# Patient Record
Sex: FEMALE | Race: WHITE | NOT HISPANIC OR LATINO | Employment: FULL TIME | URBAN - METROPOLITAN AREA
[De-identification: names, ages, dates, MRNs, and addresses within clinical notes are randomized per-mention and may not be internally consistent; named-entity substitution may affect disease eponyms.]

---

## 2019-07-21 ENCOUNTER — APPOINTMENT (OUTPATIENT)
Dept: RADIOLOGY | Facility: CLINIC | Age: 24
End: 2019-07-21
Payer: COMMERCIAL

## 2019-07-21 ENCOUNTER — OFFICE VISIT (OUTPATIENT)
Dept: URGENT CARE | Facility: CLINIC | Age: 24
End: 2019-07-21
Payer: COMMERCIAL

## 2019-07-21 VITALS
HEIGHT: 66 IN | RESPIRATION RATE: 17 BRPM | BODY MASS INDEX: 44.2 KG/M2 | TEMPERATURE: 98 F | DIASTOLIC BLOOD PRESSURE: 56 MMHG | SYSTOLIC BLOOD PRESSURE: 116 MMHG | HEART RATE: 95 BPM | WEIGHT: 275 LBS | OXYGEN SATURATION: 97 %

## 2019-07-21 DIAGNOSIS — R06.2 WHEEZING: ICD-10-CM

## 2019-07-21 DIAGNOSIS — J02.9 SORE THROAT: ICD-10-CM

## 2019-07-21 DIAGNOSIS — R05.9 COUGH: ICD-10-CM

## 2019-07-21 DIAGNOSIS — J40 BRONCHITIS: Primary | ICD-10-CM

## 2019-07-21 LAB — S PYO AG THROAT QL: NEGATIVE

## 2019-07-21 PROCEDURE — 87880 STREP A ASSAY W/OPTIC: CPT | Performed by: PHYSICIAN ASSISTANT

## 2019-07-21 PROCEDURE — 87147 CULTURE TYPE IMMUNOLOGIC: CPT | Performed by: PHYSICIAN ASSISTANT

## 2019-07-21 PROCEDURE — 71046 X-RAY EXAM CHEST 2 VIEWS: CPT

## 2019-07-21 PROCEDURE — 99203 OFFICE O/P NEW LOW 30 MIN: CPT | Performed by: PHYSICIAN ASSISTANT

## 2019-07-21 PROCEDURE — 87070 CULTURE OTHR SPECIMN AEROBIC: CPT | Performed by: PHYSICIAN ASSISTANT

## 2019-07-21 RX ORDER — BROMPHENIRAMINE MALEATE, PSEUDOEPHEDRINE HYDROCHLORIDE, AND DEXTROMETHORPHAN HYDROBROMIDE 2; 30; 10 MG/5ML; MG/5ML; MG/5ML
2.5 SYRUP ORAL 4 TIMES DAILY PRN
Qty: 120 ML | Refills: 0 | Status: SHIPPED | OUTPATIENT
Start: 2019-07-21 | End: 2019-12-12

## 2019-07-21 RX ORDER — ALBUTEROL SULFATE 90 UG/1
2 AEROSOL, METERED RESPIRATORY (INHALATION) EVERY 4 HOURS PRN
Status: DISCONTINUED | OUTPATIENT
Start: 2019-07-21 | End: 2019-07-21

## 2019-07-21 RX ORDER — SERTRALINE HYDROCHLORIDE 100 MG/1
TABLET, FILM COATED ORAL
Refills: 0 | COMMUNITY
Start: 2019-07-16 | End: 2020-02-25

## 2019-07-21 RX ORDER — LISDEXAMFETAMINE DIMESYLATE 50 MG
40 CAPSULE ORAL EVERY MORNING
Refills: 0 | COMMUNITY
Start: 2019-07-16 | End: 2020-02-25 | Stop reason: DRUGHIGH

## 2019-07-21 RX ORDER — ALBUTEROL SULFATE 90 UG/1
2 AEROSOL, METERED RESPIRATORY (INHALATION) EVERY 6 HOURS PRN
Qty: 6.7 G | Refills: 0 | Status: SHIPPED | OUTPATIENT
Start: 2019-07-21 | End: 2020-02-25

## 2019-07-21 RX ORDER — PREDNISONE 50 MG/1
50 TABLET ORAL DAILY
Qty: 5 TABLET | Refills: 0 | Status: SHIPPED | OUTPATIENT
Start: 2019-07-21 | End: 2019-07-26 | Stop reason: ALTCHOICE

## 2019-07-21 RX ORDER — TRAZODONE HYDROCHLORIDE 50 MG/1
TABLET ORAL
Refills: 0 | COMMUNITY
Start: 2019-07-16 | End: 2019-12-12

## 2019-07-21 RX ORDER — PREDNISONE 10 MG/1
50 TABLET ORAL DAILY
Status: DISCONTINUED | OUTPATIENT
Start: 2019-07-21 | End: 2019-07-21

## 2019-07-21 NOTE — PATIENT INSTRUCTIONS
Salt water gargles for sore throat  Inhaler to be used PRN for wheezing  Bromfed called in to help with cough  Will follow up if official results of the Xray  Can use Flonase and saline wash for nasal congestion  Prednisone for wheezing 50 mg once a day for 5 days  Can use humidifier  Report to ER with shortness of breath, difficulty swallowing or chest pain  Follow up with PCP in 3-5 days  Proceed to  ER if symptoms worsen  Acute Bronchitis   WHAT YOU NEED TO KNOW:   Acute bronchitis is swelling and irritation in the air passages of your lungs  This irritation may cause you to cough or have other breathing problems  Acute bronchitis often starts because of another illness, such as a cold or the flu  The illness spreads from your nose and throat to your windpipe and airways  Bronchitis is often called a chest cold  Acute bronchitis lasts about 3 to 6 weeks and is usually not a serious illness  Your cough can last for several weeks  DISCHARGE INSTRUCTIONS:   Return to the emergency department if:   · You cough up blood  · Your lips or fingernails turn blue  · You feel like you are not getting enough air when you breathe  Contact your healthcare provider if:   · You have a fever  · Your breathing problems do not go away or get worse  · Your cough does not get better within 4 weeks  · You have questions or concerns about your condition or care  Self-care:   · Get more rest   Rest helps your body to heal  Slowly start to do more each day  Rest when you feel it is needed  · Avoid irritants in the air  Avoid chemicals, fumes, and dust  Wear a face mask if you must work around dust or fumes  Stay inside on days when air pollution levels are high  If you have allergies, stay inside when pollen counts are high  Do not use aerosol products, such as spray-on deodorant, bug spray, and hair spray  · Do not smoke or be around others who smoke    Nicotine and other chemicals in cigarettes and cigars damages the cilia that move mucus out of your lungs  Ask your healthcare provider for information if you currently smoke and need help to quit  E-cigarettes or smokeless tobacco still contain nicotine  Talk to your healthcare provider before you use these products  · Drink liquids as directed  Liquids help keep your air passages moist and help you cough up mucus  You may need to drink more liquids when you have acute bronchitis  Ask how much liquid to drink each day and which liquids are best for you  · Use a humidifier or vaporizer  Use a cool mist humidifier or a vaporizer to increase air moisture in your home  This may make it easier for you to breathe and help decrease your cough  Decrease risk for acute bronchitis:   · Get the vaccinations you need  Ask your healthcare provider if you should get vaccinated against the flu or pneumonia  · Prevent the spread of germs  You can decrease your risk of acute bronchitis and other illnesses by doing the following:     [facility] AUTHOR[facility] your hands often with soap and water  Carry germ-killing hand lotion or gel with you  You can use the lotion or gel to clean your hands when soap and water are not available  ¨ Do not touch your eyes, nose, or mouth unless you have washed your hands first     ¨ Always cover your mouth when you cough to prevent the spread of germs  It is best to cough into a tissue or your shirt sleeve instead of into your hand  Ask those around you cover their mouths when they cough  ¨ Try to avoid people who have a cold or the flu  If you are sick, stay away from others as much as possible  Medicines: Your healthcare provider may  give you any of the following:  · Ibuprofen or acetaminophen  are medicines that help lower your fever  They are available without a doctor's order  Ask your healthcare provider which medicine is right for you  Ask how much to take and how often to take it  Follow directions   These medicines can cause stomach bleeding if not taken correctly  Ibuprofen can cause kidney damage  Do not take ibuprofen if you have kidney disease, an ulcer, or allergies to aspirin  Acetaminophen can cause liver damage  Do not take more than 4,000 milligrams in 24 hours  · Decongestants  help loosen mucus in your lungs and make it easier to cough up  This can help you breathe easier  · Cough suppressants  decrease your urge to cough  If your cough produces mucus, do not take a cough suppressant unless your healthcare provider tells you to  Your healthcare provider may suggest that you take a cough suppressant at night so you can rest     · Inhalers  may be given  Your healthcare provider may give you one or more inhalers to help you breathe easier and cough less  An inhaler gives your medicine to open your airways  Ask your healthcare provider to show you how to use your inhaler correctly  · Take your medicine as directed  Contact your healthcare provider if you think your medicine is not helping or if you have side effects  Tell him of her if you are allergic to any medicine  Keep a list of the medicines, vitamins, and herbs you take  Include the amounts, and when and why you take them  Bring the list or the pill bottles to follow-up visits  Carry your medicine list with you in case of an emergency  Follow up with your healthcare provider as directed:  Write down questions you have so you will remember to ask them during your follow-up visits  © 2017 [facility] Information is for End User's use only and may not be sold, redistributed or otherwise used for commercial purposes  All illustrations and images included in CareNotes® are the copyrighted property of A D A M [employer] Inc  or [employer] Pr[employer]  The above information is an  only  It is not intended as medical advice for individual conditions or treatments   Talk to your doctor, nurse or pharmacist before following any medical regimen to see if it is safe and effective for you  Sore Throat, Ambulatory Care   GENERAL INFORMATION:   A sore throat  is often caused by a cold or flu virus  A sore throat may also be caused by bacteria such as strep  Other causes include smoking, a runny nose, allergies, or acid reflux  Seek immediate care for the following symptoms:   · Trouble breathing or swallowing because your throat is swollen or sore    · Drooling because it hurts too much to swallow    · A painful lump in your throat that does not go away after 5 days    · A fever higher than 102? F (39?C) or lasts longer than 3 days    · Confusion    · Blood in your throat or ear  Treatment for a sore throat  will depend on the cause how severe it is  A sore throat cause by a virus will go away on its own without treatment  You will need antibiotics if your sore throat is caused by bacteria  Your sore throat should start to feel better within 3 to 5 days for both viral and bacterial infections  Care for your sore throat:   · Gargle with salt water  Mix ¼ teaspoon salt in a glass of warm water and gargle  This may help reduce swelling in your throat  · Take ibuprofen or acetaminophen:  These medicines decrease pain and fever  They are available without a doctor's order  Ask your healthcare provider which medicine is best for you  Ask how much to take and how often to take it  · Drink more liquids  Cold or warm drinks may help soothe your sore throat  Drinking liquids can also help prevent dehydration  · Use a cool-steam humidifier  to help moisten the air in your room and reduce your throat pain  · Use lozenges, ice, soft foods, or popsicles  to soothe your throat  · Rest your throat as much as possible  Try not to use your voice  This may irritate your throat and worsen your symptoms  Follow up with your healthcare provider as directed:  Write down your questions so you remember to ask them during your visits    CARE AGREEMENT:   You have the right to help plan your care  Learn about your health condition and how it may be treated  Discuss treatment options with your caregivers to decide what care you want to receive  You always have the right to refuse treatment  The above information is an  only  It is not intended as medical advice for individual conditions or treatments  Talk to your doctor, nurse or pharmacist before following any medical regimen to see if it is safe and effective for you  © 2014 [address] is for End User's use only and may not be sold, redistributed or otherwise used for commercial purposes  All illustrations and images included in CareNotes® are the copyrighted property of [employer]  or [employer] Pratt

## 2019-07-21 NOTE — PROGRESS NOTES
[facility]        NAME: Demario Frank is a 21 y o  female  : 1995    MRN: [id number]  DATE: 2019  TIME: 9:24 AM    Assessment and Plan   Bronchitis [J40]  1  Bronchitis  brompheniramine-pseudoephedrine-DM 30-2-10 MG/5ML syrup    predniSONE 50 mg tablet    albuterol (PROVENTIL HFA) 90 mcg/act inhaler    DISCONTINUED: albuterol (PROVENTIL HFA,VENTOLIN HFA) inhaler 2 puff    DISCONTINUED: predniSONE tablet 50 mg   2  Cough  XR chest pa & lateral    POCT rapid strepA    brompheniramine-pseudoephedrine-DM 30-2-10 MG/5ML syrup    predniSONE 50 mg tablet    albuterol (PROVENTIL HFA) 90 mcg/act inhaler    DISCONTINUED: albuterol (PROVENTIL HFA,VENTOLIN HFA) inhaler 2 puff    DISCONTINUED: predniSONE tablet 50 mg    CANCELED: XR ribs bilateral 4+ vw w pa chest   3  Wheezing  XR chest pa & lateral    predniSONE 50 mg tablet    albuterol (PROVENTIL HFA) 90 mcg/act inhaler    DISCONTINUED: albuterol (PROVENTIL HFA,VENTOLIN HFA) inhaler 2 puff    DISCONTINUED: predniSONE tablet 50 mg    CANCELED: XR ribs bilateral 4+ vw w pa chest   4  Sore throat  Throat culture         Patient Instructions   Salt water gargles for sore throat  Inhaler to be used PRN for wheezing  Bromfed called in to help with cough  Will follow up if official results of the Xray  results reviewed in office  Official report also reviewed after patient left office which showed no disease  Rapid strep negative in office  Will send out for culture  Can use Flonase and saline wash for nasal congestion  Prednisone for wheezing 50 mg once a day for 5 days  Can use humidifier  Report to ER with shortness of breath, difficulty swallowing or chest pain  Follow up with PCP in 3-5 days  Proceed to  ER if symptoms worsen  Chief Complaint     Chief Complaint   Patient presents with    Cough     Pt reports of worsening cough and congestion started approx 1 week ago            History of Present Illness       HPI  This is a 21year old female here c/o of sore throat and productive cough for one week  Patient notes she is visiting from [facility] where she lives and landed last saturday  She notes some nasal congestion  Denies ear pain, fever, chills, nausea, vomiting, shortness of breathe or chest pain  Has had pneumonia twice in the past  Has been taking dayquil and nyquil for her symptoms  Review of Systems   Review of Systems   Constitutional: Negative for chills and fever  HENT: Positive for congestion and sore throat  Negative for ear pain, mouth sores, sinus pressure and sinus pain  Eyes: Negative for pain and discharge  Respiratory: Positive for cough  Negative for shortness of breath  Cardiovascular: Negative for chest pain and palpitations  Gastrointestinal: Negative for abdominal pain, diarrhea, nausea and vomiting  Skin: Negative for rash  Current Medications       Current Outpatient Medications:     albuterol (PROVENTIL HFA) 90 mcg/act inhaler, Inhale 2 puffs every 6 (six) hours as needed for wheezing, Disp: 6 7 g, Rfl: 0    brompheniramine-pseudoephedrine-DM 30-2-10 MG/5ML syrup, Take 2 5 mL by mouth 4 (four) times a day as needed for allergies, Disp: 120 mL, Rfl: 0    predniSONE 50 mg tablet, Take 1 tablet (50 mg total) by mouth daily, Disp: 5 tablet, Rfl: 0    sertraline (ZOLOFT) 100 mg tablet, , Disp: , Rfl: 0    traZODone (DESYREL) 50 mg tablet, , Disp: , Rfl: 0    VYVANSE 50 MG capsule, , Disp: , Rfl: 0  No current facility-administered medications for this visit       Current Allergies     Allergies as of 07/21/2019 - Reviewed 07/21/2019   Allergen Reaction Noted    Lorabid [loracarbef]  07/21/2019            The following portions of the patient's history were reviewed and updated as appropriate: allergies, current medications, past family history, past medical history, past social history, past surgical history and problem list      Past Medical History:   Diagnosis Date    Anxiety     COPD (chronic obstructive pulmonary disease) ([facility] [place] [address] )        History reviewed  No pertinent surgical history  History reviewed  No pertinent family history  Medications have been verified  Objective   /56   Pulse 95   Temp 98 °F (36 7 °C)   Resp 17   Ht 5' 6" (1 676 m)   Wt 125 kg (275 lb)   LMP 06/22/2019   SpO2 97%   BMI 44 39 kg/m²        Physical Exam     Physical Exam   Constitutional: She is oriented to person, place, and time  She appears well-developed and well-nourished  No distress  HENT:   Right Ear: External ear normal    Left Ear: External ear normal    Mouth/Throat: Posterior oropharyngeal erythema present  No oropharyngeal exudate, posterior oropharyngeal edema or tonsillar abscesses  Tonsils are 3+ on the right  Tonsils are 3+ on the left  No tonsillar exudate  Eyes: Conjunctivae are normal    Neck: Normal range of motion  Neck supple  Cardiovascular: Normal rate, regular rhythm and normal heart sounds  Pulmonary/Chest: Effort normal  No stridor  She has no decreased breath sounds  She has wheezes in the right upper field, the right middle field, the right lower field, the left upper field, the left middle field and the left lower field  She has no rhonchi  She has no rales  Abdominal: Soft  Bowel sounds are normal    Lymphadenopathy:     She has no cervical adenopathy  Neurological: She is alert and oriented to person, place, and time  Psychiatric: She has a normal mood and affect   Her behavior is normal

## 2019-07-22 ENCOUNTER — TELEPHONE (OUTPATIENT)
Dept: BARIATRICS | Facility: CLINIC | Age: 24
End: 2019-07-22

## 2019-07-22 DIAGNOSIS — J02.0 STREP THROAT: Primary | ICD-10-CM

## 2019-07-22 LAB — BACTERIA THROAT CULT: ABNORMAL

## 2019-07-22 RX ORDER — PENICILLIN V POTASSIUM 250 MG/1
250 TABLET ORAL 4 TIMES DAILY
Qty: 40 TABLET | Refills: 0 | Status: SHIPPED | OUTPATIENT
Start: 2019-07-22 | End: 2019-08-01

## 2019-07-22 NOTE — TELEPHONE ENCOUNTER
Milo calls from [facility] for clarification of this prescription  Their records indicate that this patient is allergic to penicillin  When she questions patient, patient indicates she is actually allergic to [address], but pharmacist feels this is very closely related to penicillin  Please advise

## 2019-07-26 ENCOUNTER — OFFICE VISIT (OUTPATIENT)
Dept: FAMILY MEDICINE CLINIC | Facility: CLINIC | Age: 24
End: 2019-07-26
Payer: COMMERCIAL

## 2019-07-26 VITALS
SYSTOLIC BLOOD PRESSURE: 122 MMHG | BODY MASS INDEX: 43.23 KG/M2 | TEMPERATURE: 99.4 F | HEIGHT: 66 IN | DIASTOLIC BLOOD PRESSURE: 80 MMHG | RESPIRATION RATE: 16 BRPM | HEART RATE: 84 BPM | WEIGHT: 269 LBS

## 2019-07-26 DIAGNOSIS — A60.04 HERPES SIMPLEX VULVOVAGINITIS: ICD-10-CM

## 2019-07-26 DIAGNOSIS — N89.8 VAGINAL DISCHARGE: ICD-10-CM

## 2019-07-26 DIAGNOSIS — N76.0 ACUTE VAGINITIS: Primary | ICD-10-CM

## 2019-07-26 PROBLEM — J02.9 SORE THROAT: Status: RESOLVED | Noted: 2019-07-21 | Resolved: 2019-07-26

## 2019-07-26 PROBLEM — A60.00 GENITAL HERPES: Status: ACTIVE | Noted: 2019-07-26

## 2019-07-26 PROBLEM — J40 BRONCHITIS: Status: RESOLVED | Noted: 2019-07-21 | Resolved: 2019-07-26

## 2019-07-26 LAB
C TRACH RRNA SPEC QL PROBE: NEGATIVE
N GONORRHOEA RRNA SPEC QL PROBE: NEGATIVE

## 2019-07-26 PROCEDURE — 99214 OFFICE O/P EST MOD 30 MIN: CPT | Performed by: NURSE PRACTITIONER

## 2019-07-26 RX ORDER — VALACYCLOVIR HYDROCHLORIDE 1 G/1
1000 TABLET, FILM COATED ORAL 2 TIMES DAILY
Qty: 28 TABLET | Refills: 0 | Status: SHIPPED | OUTPATIENT
Start: 2019-07-26 | End: 2019-12-12

## 2019-07-26 RX ORDER — TOPIRAMATE 50 MG/1
TABLET, FILM COATED ORAL DAILY PRN
Refills: 1 | COMMUNITY
Start: 2019-07-25 | End: 2022-04-29

## 2019-07-26 RX ORDER — FLUCONAZOLE 150 MG/1
150 TABLET ORAL ONCE
Qty: 1 TABLET | Refills: 0 | Status: SHIPPED | OUTPATIENT
Start: 2019-07-26 | End: 2019-07-26

## 2019-07-26 NOTE — PROGRESS NOTES
Assessment/Plan:    Cultures obtained  Will f/u with results of cultures  For now, will treat with Valacyclovir and provide an additional week of treatment in case she should have another outbreak while in [facility]  Also advised to f/u with her gynecologist to discuss suppressive therapy if she continues to have frequent outbreaks  Will f/u with culture results    Problem List Items Addressed This Visit        Genitourinary    Genital herpes    Relevant Medications    valACYclovir (VALTREX) 1,000 mg tablet    Other Relevant Orders    Chlamydia/GC amplified DNA by PCR    Virus culture    VAGINOSIS DNA PROBE (AFFIRM)    Genital Comprehensive Culture      Other Visit Diagnoses     Acute vaginitis    -  Primary    Relevant Medications    valACYclovir (VALTREX) 1,000 mg tablet    fluconazole (DIFLUCAN) 150 mg tablet    Other Relevant Orders    Chlamydia/GC amplified DNA by PCR    Virus culture    VAGINOSIS DNA PROBE (AFFIRM)    Genital Comprehensive Culture    Vaginal discharge        Relevant Orders    Chlamydia/GC amplified DNA by PCR    Virus culture    VAGINOSIS DNA PROBE (AFFIRM)    Genital Comprehensive Culture                Patient Instructions   Take Valacyclovir for 7 days  Keep the rest of the prescription in case you have another outbreak  Return if symptoms worsen or fail to improve  Subjective:      Patient ID: Demario Frank is a 21 y o  female  Chief Complaint   Patient presents with    Rash     vaginal area-lj    initial visit       A couple of months ago, she left a tampon in, and had persistent vaginal pain  She noticed a pimply rash inside her vagina and outside  [facility]  She was then treated for a yeast infection with a pill and topical treatment  Symptoms resolved  She has since had intercourse, but has been using condoms  She returned home 2 weeks ago and since 1 week ago, it felt like she had cuts inside of her vagina    Her partner is now symptomatic as well with blistering  New partner in [facility]  She has been most recently apply Neosporin and Vagisil  She will be returning to [facility] tomorrow and will be there until the end of August        The following portions of the patient's history were reviewed and updated as appropriate: allergies, current medications, past family history, past medical history, past social history, past surgical history and problem list     Review of Systems   Constitutional: Negative  Genitourinary:        See HPI   All other systems reviewed and are negative  Current Outpatient Medications   Medication Sig Dispense Refill    albuterol (PROVENTIL HFA) 90 mcg/act inhaler Inhale 2 puffs every 6 (six) hours as needed for wheezing 6 7 g 0    brompheniramine-pseudoephedrine-DM 30-2-10 MG/5ML syrup Take 2 5 mL by mouth 4 (four) times a day as needed for allergies 120 mL 0    penicillin V potassium (VEETID) 250 mg tablet Take 1 tablet (250 mg total) by mouth 4 (four) times a day for 10 days 40 tablet 0    sertraline (ZOLOFT) 100 mg tablet   0    topiramate (TOPAMAX) 50 MG tablet   1    traZODone (DESYREL) 50 mg tablet   0    VYVANSE 50 MG capsule   0    fluconazole (DIFLUCAN) 150 mg tablet Take 1 tablet (150 mg total) by mouth once for 1 dose 1 tablet 0    valACYclovir (VALTREX) 1,000 mg tablet Take 1 tablet (1,000 mg total) by mouth 2 (two) times a day for 14 days 28 tablet 0     No current facility-administered medications for this visit  Objective:    /80   Pulse 84   Temp 99 4 °F (37 4 °C)   Resp 16   Ht 5' 6" (1 676 m)   Wt 122 kg (269 lb)   BMI 43 42 kg/m²        Physical Exam   Constitutional: She appears well-developed and well-nourished  Cardiovascular: Normal rate, regular rhythm and normal heart sounds  No murmur heard  Pulmonary/Chest: Effort normal and breath sounds normal    Genitourinary: Vaginal discharge (white) found     Genitourinary Comments: Vesicular, erythematous lesions involving labia and inside the vaginal vault  Neurological: She is alert  Skin: Skin is warm and dry  Psychiatric: She has a normal mood and affect  Nursing note and vitals reviewed               Trung Campbell

## 2019-07-30 ENCOUNTER — TELEPHONE (OUTPATIENT)
Dept: FAMILY MEDICINE CLINIC | Facility: CLINIC | Age: 24
End: 2019-07-30

## 2019-07-30 LAB
C TRACH RRNA SPEC QL NAA+PROBE: NOT DETECTED
N GONORRHOEA RRNA SPEC QL NAA+PROBE: NOT DETECTED

## 2019-12-12 ENCOUNTER — OFFICE VISIT (OUTPATIENT)
Dept: URGENT CARE | Facility: CLINIC | Age: 24
End: 2019-12-12
Payer: COMMERCIAL

## 2019-12-12 VITALS
HEART RATE: 100 BPM | BODY MASS INDEX: 44 KG/M2 | OXYGEN SATURATION: 97 % | RESPIRATION RATE: 18 BRPM | WEIGHT: 273.8 LBS | SYSTOLIC BLOOD PRESSURE: 130 MMHG | HEIGHT: 66 IN | TEMPERATURE: 99.2 F | DIASTOLIC BLOOD PRESSURE: 80 MMHG

## 2019-12-12 DIAGNOSIS — J45.21 MILD INTERMITTENT ASTHMATIC BRONCHITIS WITH ACUTE EXACERBATION: Primary | ICD-10-CM

## 2019-12-12 PROCEDURE — 99213 OFFICE O/P EST LOW 20 MIN: CPT | Performed by: PHYSICIAN ASSISTANT

## 2019-12-12 RX ORDER — BENZONATATE 200 MG/1
200 CAPSULE ORAL 3 TIMES DAILY PRN
Qty: 20 CAPSULE | Refills: 0 | Status: SHIPPED | OUTPATIENT
Start: 2019-12-12 | End: 2020-02-25

## 2019-12-12 RX ORDER — AZITHROMYCIN 250 MG/1
TABLET, FILM COATED ORAL
Qty: 6 TABLET | Refills: 0 | Status: SHIPPED | OUTPATIENT
Start: 2019-12-12 | End: 2019-12-16

## 2019-12-12 RX ORDER — PREDNISONE 50 MG/1
50 TABLET ORAL DAILY
Qty: 5 TABLET | Refills: 0 | Status: SHIPPED | OUTPATIENT
Start: 2019-12-12 | End: 2019-12-17

## 2019-12-12 NOTE — PATIENT INSTRUCTIONS
Acute Bronchitis   WHAT YOU NEED TO KNOW:   Acute bronchitis is swelling and irritation in the air passages of your lungs  This irritation may cause you to cough or have other breathing problems  Acute bronchitis often starts because of another illness, such as a cold or the flu  The illness spreads from your nose and throat to your windpipe and airways  Bronchitis is often called a chest cold  Acute bronchitis lasts about 3 to 6 weeks and is usually not a serious illness  Your cough can last for several weeks  DISCHARGE INSTRUCTIONS:   Return to the emergency department if:   · You cough up blood  · Your lips or fingernails turn blue  · You feel like you are not getting enough air when you breathe  Contact your healthcare provider if:   · You have a fever  · Your breathing problems do not go away or get worse  · Your cough does not get better within 4 weeks  · You have questions or concerns about your condition or care  Self-care:   · Get more rest   Rest helps your body to heal  Slowly start to do more each day  Rest when you feel it is needed  · Avoid irritants in the air  Avoid chemicals, fumes, and dust  Wear a face mask if you must work around dust or fumes  Stay inside on days when air pollution levels are high  If you have allergies, stay inside when pollen counts are high  Do not use aerosol products, such as spray-on deodorant, bug spray, and hair spray  · Do not smoke or be around others who smoke  Nicotine and other chemicals in cigarettes and cigars damages the cilia that move mucus out of your lungs  Ask your healthcare provider for information if you currently smoke and need help to quit  E-cigarettes or smokeless tobacco still contain nicotine  Talk to your healthcare provider before you use these products  · Drink liquids as directed  Liquids help keep your air passages moist and help you cough up mucus   You may need to drink more liquids when you have acute bronchitis  Ask how much liquid to drink each day and which liquids are best for you  · Use a humidifier or vaporizer  Use a cool mist humidifier or a vaporizer to increase air moisture in your home  This may make it easier for you to breathe and help decrease your cough  Decrease risk for acute bronchitis:   · Get the vaccinations you need  Ask your healthcare provider if you should get vaccinated against the flu or pneumonia  · Prevent the spread of germs  You can decrease your risk of acute bronchitis and other illnesses by doing the following:     [facility] your hands often with soap and water  Carry germ-killing hand lotion or gel with you  You can use the lotion or gel to clean your hands when soap and water are not available  ¨ Do not touch your eyes, nose, or mouth unless you have washed your hands first     ¨ Always cover your mouth when you cough to prevent the spread of germs  It is best to cough into a tissue or your shirt sleeve instead of into your hand  Ask those around you cover their mouths when they cough  ¨ Try to avoid people who have a cold or the flu  If you are sick, stay away from others as much as possible  Medicines: Your healthcare provider may  give you any of the following:  · Ibuprofen or acetaminophen  are medicines that help lower your fever  They are available without a doctor's order  Ask your healthcare provider which medicine is right for you  Ask how much to take and how often to take it  Follow directions  These medicines can cause stomach bleeding if not taken correctly  Ibuprofen can cause kidney damage  Do not take ibuprofen if you have kidney disease, an ulcer, or allergies to aspirin  Acetaminophen can cause liver damage  Do not take more than 4,000 milligrams in 24 hours  · Decongestants  help loosen mucus in your lungs and make it easier to cough up  This can help you breathe easier  · Cough suppressants  decrease your urge to cough   If your cough produces mucus, do not take a cough suppressant unless your healthcare provider tells you to  Your healthcare provider may suggest that you take a cough suppressant at night so you can rest     · Inhalers  may be given  Your healthcare provider may give you one or more inhalers to help you breathe easier and cough less  An inhaler gives your medicine to open your airways  Ask your healthcare provider to show you how to use your inhaler correctly  · Take your medicine as directed  Contact your healthcare provider if you think your medicine is not helping or if you have side effects  Tell him of her if you are allergic to any medicine  Keep a list of the medicines, vitamins, and herbs you take  Include the amounts, and when and why you take them  Bring the list or the pill bottles to follow-up visits  Carry your medicine list with you in case of an emergency  Follow up with your healthcare provider as directed:  Write down questions you have so you will remember to ask them during your follow-up visits  © 2017 [address] Information is for End User's use only and may not be sold, redistributed or otherwise used for commercial purposes  All illustrations and images included in CareNotes® are the copyrighted property of [employer]  or [employer]  The above information is an  only  It is not intended as medical advice for individual conditions or treatments  Talk to your doctor, nurse or pharmacist before following any medical regimen to see if it is safe and effective for you

## 2019-12-12 NOTE — PROGRESS NOTES
[facility]        NAME: Fredi Cannon is a 25 y o  female  : 1995    MRN: [id number]  DATE: 2019  TIME: 10:04 AM    Assessment and Plan   Mild intermittent asthmatic bronchitis with acute exacerbation [J45 21]  1  Mild intermittent asthmatic bronchitis with acute exacerbation  azithromycin (ZITHROMAX) 250 mg tablet    predniSONE 50 mg tablet    benzonatate (TESSALON) 200 MG capsule         Patient Instructions     Discussed condition with pt  She has asthmatic bronchitis and also currently vapes so I will treat her with Z-pack as well as an oral Prednisone burst and Tessalon Perles and rec continuing Mucinex, rescue inhaler PRN as well as fluids, rest, observation  Follow up with PCP in 3-5 days  Proceed to  ER if symptoms worsen  Chief Complaint     Chief Complaint   Patient presents with    Cold Like Symptoms     x 3 days - nasal congestion with rhinorrhea, chest tightness with pain mid chest, SOB and occ  wheeze  Using Alb HFA QID ()  Ear pressure, chill and congested cough - non productive  Taking Mucinex  No Flu vaccine   Cough    Shortness of Breath         History of Present Illness       Pt presents with 3 day history of nasal congestion, chest congestion, PND, cough, ST, runny nose, wheezing, SOB, ear pressure  Denies fever, chills, N/V/D  She has taken Mucinex, Dayquil, Nyquil  Had asthma as a child but no allergies  Has inhaler to use PRN  She vapes and is a former smoker  She has not yet had the flu shot  Review of Systems   Review of Systems   Constitutional: Negative  HENT: Positive for congestion, ear pain, postnasal drip, rhinorrhea and sore throat  Respiratory: Positive for cough, chest tightness, shortness of breath and wheezing  Cardiovascular: Negative  Gastrointestinal: Negative  Genitourinary: Negative            Current Medications       Current Outpatient Medications:     albuterol (PROVENTIL HFA) 90 mcg/act inhaler, Inhale 2 puffs every 6 (six) hours as needed for wheezing, Disp: 6 7 g, Rfl: 0    sertraline (ZOLOFT) 100 mg tablet, , Disp: , Rfl: 0    topiramate (TOPAMAX) 50 MG tablet, daily as needed , Disp: , Rfl: 1    VYVANSE 50 MG capsule, 40 mg every morning , Disp: , Rfl: 0    azithromycin (ZITHROMAX) 250 mg tablet, Take 2 tablets day 1 with food, then 1 tablet daily days 2-5 with food  , Disp: 6 tablet, Rfl: 0    benzonatate (TESSALON) 200 MG capsule, Take 1 capsule (200 mg total) by mouth 3 (three) times a day as needed for cough, Disp: 20 capsule, Rfl: 0    predniSONE 50 mg tablet, Take 1 tablet (50 mg total) by mouth daily for 5 days, Disp: 5 tablet, Rfl: 0    Current Allergies     Allergies as of 12/12/2019 - Reviewed 12/12/2019   Allergen Reaction Noted    Lorabid [loracarbef] Other (See Comments) 07/21/2019            The following portions of the patient's history were reviewed and updated as appropriate: allergies, current medications, past family history, past medical history, past social history, past surgical history and problem list      Past Medical History:   Diagnosis Date    ADHD (attention deficit hyperactivity disorder)     Anxiety     Asthma     [facility]     Depression     Pneumonia        History reviewed  No pertinent surgical history  History reviewed  No pertinent family history  Medications have been verified  Objective   /80 (BP Location: Left arm, Patient Position: Sitting, Cuff Size: Large)   Pulse 100   Temp 99 2 °F (37 3 °C) (Tympanic)   Resp 18   Ht 5' 6" (1 676 m)   Wt 124 kg (273 lb 12 8 oz)   LMP 11/21/2019 (Exact Date)   SpO2 97%   BMI 44 19 kg/m²        Physical Exam     Physical Exam   Constitutional: She is oriented to person, place, and time  She appears well-developed and well-nourished  No distress     HENT:   Right Ear: Hearing, tympanic membrane, external ear and ear canal normal    Left Ear: Hearing, tympanic membrane, external ear and ear canal normal    Nose: Mucosal edema (B/L boggy turbinates) present  Mouth/Throat: Mucous membranes are normal  Posterior oropharyngeal erythema (PND) present  No oropharyngeal exudate  Neck: Neck supple  Cardiovascular: Normal rate, regular rhythm and normal heart sounds  Pulmonary/Chest: Effort normal  She has wheezes (B/L diffuse scattered wheezes heard throughout)  Lymphadenopathy:     She has no cervical adenopathy  Neurological: She is alert and oriented to person, place, and time  Psychiatric: She has a normal mood and affect  Vitals reviewed

## 2019-12-12 NOTE — LETTER
December 12, 2019     Patient: Naseem Garber   YOB: 1995   Date of Visit: 12/12/2019       To Whom it May Concern:    Suad Salgado was seen in my clinic on 12/12/2019  She may return to work on 12/13/2019  If you have any questions or concerns, please don't hesitate to call           Sincerely,          Lashay Mcrae PA-C        CC: No Recipients

## 2020-02-21 ENCOUNTER — TELEPHONE (OUTPATIENT)
Dept: FAMILY MEDICINE CLINIC | Facility: CLINIC | Age: 25
End: 2020-02-21

## 2020-02-25 ENCOUNTER — OFFICE VISIT (OUTPATIENT)
Dept: FAMILY MEDICINE CLINIC | Facility: CLINIC | Age: 25
End: 2020-02-25
Payer: COMMERCIAL

## 2020-02-25 VITALS
SYSTOLIC BLOOD PRESSURE: 116 MMHG | TEMPERATURE: 97.9 F | DIASTOLIC BLOOD PRESSURE: 74 MMHG | HEIGHT: 66 IN | BODY MASS INDEX: 45.8 KG/M2 | WEIGHT: 285 LBS | HEART RATE: 80 BPM | RESPIRATION RATE: 16 BRPM

## 2020-02-25 DIAGNOSIS — E66.01 MORBID OBESITY (HCC): ICD-10-CM

## 2020-02-25 DIAGNOSIS — Z13.6 SCREENING FOR CARDIOVASCULAR CONDITION: ICD-10-CM

## 2020-02-25 DIAGNOSIS — Z13.29 SCREENING FOR THYROID DISORDER: ICD-10-CM

## 2020-02-25 DIAGNOSIS — Z23 NEED FOR VACCINATION: ICD-10-CM

## 2020-02-25 DIAGNOSIS — Z00.00 ROUTINE ADULT HEALTH MAINTENANCE: Primary | ICD-10-CM

## 2020-02-25 PROBLEM — F32.A DEPRESSION: Status: ACTIVE | Noted: 2020-02-25

## 2020-02-25 PROBLEM — F41.9 ANXIETY AND DEPRESSION: Status: ACTIVE | Noted: 2020-02-25

## 2020-02-25 PROCEDURE — 3008F BODY MASS INDEX DOCD: CPT | Performed by: NURSE PRACTITIONER

## 2020-02-25 PROCEDURE — 90686 IIV4 VACC NO PRSV 0.5 ML IM: CPT

## 2020-02-25 PROCEDURE — 90471 IMMUNIZATION ADMIN: CPT

## 2020-02-25 PROCEDURE — 90472 IMMUNIZATION ADMIN EACH ADD: CPT

## 2020-02-25 PROCEDURE — 90715 TDAP VACCINE 7 YRS/> IM: CPT

## 2020-02-25 PROCEDURE — 99395 PREV VISIT EST AGE 18-39: CPT | Performed by: NURSE PRACTITIONER

## 2020-02-25 RX ORDER — LISDEXAMFETAMINE DIMESYLATE 40 MG/1
40 CAPSULE ORAL DAILY
COMMUNITY
Start: 2020-02-06 | End: 2022-04-29

## 2020-02-25 NOTE — PROGRESS NOTES
FAMILY PRACTICE HEALTH MAINTENANCE OFFICE VISIT  [facility]    NAME: Sushila Reveal  AGE: 25 y o  SEX: female  : 1995     DATE: 2020    Assessment and Plan     1  Routine adult health maintenance    2  Need for vaccination  -     TDAP VACCINE GREATER THAN OR EQUAL TO 8YO IM  -     influenza vaccine, [id number], quadrivalent, 0 5 mL, preservative-free, for adult and pediatric patients 6 mos+ (AFLURIA, FLUARIX, [address], [address])    3  Screening for cardiovascular condition  -     Comprehensive metabolic panel; Future  -     CBC and differential; Future  -     LIPID PANEL + LDL/HDL RATIO; Future    4  Screening for thyroid disorder  -     TSH, 3rd generation with Free T4 reflex; Future    5  Morbid obesity ([address] )  Assessment & Plan:  Discussed importance of weight loss, diet, and exercise        · Patient Counseling:   · Nutrition: Stressed importance of a well balanced diet, moderation of sodium/saturated fat, caloric balance and sufficient intake of fiber  · Exercise: Stressed the importance of regular exercise with a goal of 150 minutes per week  · Dental Health: Discussed daily flossing and brushing and regular dental visits   · Sexuality: Discussed sexually transmitted infections, use of condoms and prevention of unintended pregnancy    · Immunizations reviewed: See Orders  · Discussed benefits of:  Cervical Cancer screening and Screening labs   BMI Counseling: Body mass index is 46 kg/m²  Discussed with patient's BMI with her  The BMI is above normal  Exercise recommendations include moderate aerobic physical activity for 150 minutes/week      Return for Annual physical         Chief Complaint     Chief Complaint   Patient presents with    Physical Exam     lj       History of Present Illness     Here today for CPE  Sees Dr Dory Lee for ob/gyn care  Offers no complaints or concerns        Well Adult Physical   Patient here for a comprehensive physical exam       Diet and Physical Activity  Diet: excess calories, poor choices, works at [employer] and eats late at night  Exercise: infrequently      Depression Screen  PHQ-9 Depression Screening    PHQ-9:    Frequency of the following problems over the past two weeks:               General Health  Hearing: Normal:  bilateral  Vision: goes for regular eye exams and most recent eye exam <1 year  Dental: brushes teeth twice daily    Reproductive Health  Irregular Smooth Lee      The following portions of the patient's history were reviewed and updated as appropriate: allergies, current medications, past family history, past medical history, past social history, past surgical history and problem list     Review of Systems     Review of Systems   Constitutional: Negative  Respiratory: Negative for cough, chest tightness and shortness of breath  Gastrointestinal: Negative for abdominal pain, constipation, diarrhea and vomiting  Genitourinary: Negative for difficulty urinating  Neurological: Negative for dizziness and headaches         Past Medical History     Past Medical History:   Diagnosis Date    ADHD (attention deficit hyperactivity disorder)     Anxiety     Asthma     [facility]ch[facility] disease) [facility]     Depression     Pneumonia        Past Surgical History     Past Surgical History:   Procedure Laterality Date    WISDOM TOOTH EXTRACTION         Social History     Social History     Socioeconomic History    Marital status: Single     Spouse name: None    Number of children: None    Years of education: None    Highest education level: None   Occupational History    None   Social Needs    Financial resource strain: None    Food insecurity:     Worry: None     Inability: None    Transportation needs:     Medical: None     Non-medical: None   Tobacco Use    Smoking status: Former Smoker     Types: E-Cigarettes    Smokeless tobacco: Current User    Tobacco comment: juul - equivalent to 1 ppd   Substance and Sexual Activity    Alcohol use: Yes     Alcohol/week: 2 0 standard drinks     Types: 2 Standard drinks or equivalent per week     Frequency: 2-3 times a week     Drinks per session: 1 or 2    Drug use: Yes     Types: Marijuana    Sexual activity: Yes     Birth control/protection: Condom Male   Lifestyle    Physical activity:     Days per week: None     Minutes per session: None    Stress: None   Relationships    Social connections:     Talks on phone: None     Gets together: None     Attends Oriental orthodox service: None     Active member of club or organization: None     Attends meetings of clubs or organizations: None     Relationship status: None    Intimate partner violence:     Fear of current or ex partner: None     Emotionally abused: None     Physically abused: None     Forced sexual activity: None   Other Topics Concern    None   Social History Narrative    None       Family History     Family History   Problem Relation Age of Onset    Hypertension Mother     COPD Father     Heart disease Father     Hypertension Father     Substance Abuse Neg Hx     Mental illness Neg Hx        Current Medications       Current Outpatient Medications:     topiramate (TOPAMAX) 50 MG tablet, daily as needed , Disp: , Rfl: 1    VYVANSE 40 MG capsule, 40 mg daily, Disp: , Rfl:      Allergies     Allergies   Allergen Reactions    Lorabid [Loracarbef] Other (See Comments)     Unknown since childhood       Objective     /74   Pulse 80   Temp 97 9 °F (36 6 °C)   Resp 16   Ht 5' 6" (1 676 m)   Wt 129 kg (285 lb)   BMI 46 00 kg/m²      Physical Exam   Constitutional: She is oriented to person, place, and time  She appears well-developed and well-nourished  HENT:   Head: Normocephalic     Right Ear: External ear normal    Left Ear: External ear normal    Nose: Nose normal    Mouth/Throat: Oropharynx is clear and moist    Eyes: Pupils are equal, round, and reactive to light  Conjunctivae and EOM are normal    Neck: Neck supple  No thyromegaly present  Cardiovascular: Normal rate, regular rhythm, normal heart sounds and intact distal pulses  No murmur heard  Pulmonary/Chest: Effort normal and breath sounds normal    Abdominal: Soft  Bowel sounds are normal  She exhibits no distension  Musculoskeletal: Normal range of motion  She exhibits no edema  Lymphadenopathy:     She has no cervical adenopathy  Neurological: She is alert and oriented to person, place, and time  She has normal reflexes  Skin: Skin is warm, dry and intact  Psychiatric: She has a normal mood and affect  Nursing note and vitals reviewed           Visual Acuity Screening    Right eye Left eye Both eyes   Without correction:      With correction: 20/20 20/20 20/20           [employer], [address] [place] [address]

## 2020-02-25 NOTE — PATIENT INSTRUCTIONS

## 2020-03-26 ENCOUNTER — TELEMEDICINE (OUTPATIENT)
Dept: FAMILY MEDICINE CLINIC | Facility: CLINIC | Age: 25
End: 2020-03-26
Payer: COMMERCIAL

## 2020-03-26 DIAGNOSIS — J20.9 ACUTE BRONCHITIS, UNSPECIFIED ORGANISM: Primary | ICD-10-CM

## 2020-03-26 PROCEDURE — 99442 PR PHYS/QHP TELEPHONE EVALUATION 11-20 MIN: CPT | Performed by: NURSE PRACTITIONER

## 2020-03-26 RX ORDER — SERTRALINE HYDROCHLORIDE 100 MG/1
TABLET, FILM COATED ORAL
COMMUNITY
Start: 2020-03-12 | End: 2022-04-29

## 2020-03-26 RX ORDER — AZITHROMYCIN 250 MG/1
TABLET, FILM COATED ORAL
Qty: 6 TABLET | Refills: 0 | Status: SHIPPED | OUTPATIENT
Start: 2020-03-26 | End: 2020-03-31

## 2020-03-26 RX ORDER — ALBUTEROL SULFATE 90 UG/1
2 AEROSOL, METERED RESPIRATORY (INHALATION) EVERY 4 HOURS PRN
Qty: 8.5 G | Refills: 0 | Status: SHIPPED | OUTPATIENT
Start: 2020-03-26 | End: 2022-04-29

## 2020-03-26 NOTE — PROGRESS NOTES
Virtual Regular Visit    Problem List Items Addressed This Visit     None      Visit Diagnoses     Acute bronchitis, unspecified organism    -  Primary    Relevant Medications    azithromycin (ZITHROMAX) 250 mg tablet    albuterol (ProAir HFA) 90 mcg/act inhaler      Will treat as above  Recommended she continue Mucinex and drink plenty of fluids  Advised to self quarantine until 7 days from symptom onset have passed  To call with persistent or worsening symptoms         Reason for visit is possible bronchitis    Encounter provider PHILIPPE Plata    Provider located at [place] [address]  [address] [address]      Recent Visits  No visits were found meeting these conditions  Showing recent visits within past 7 days and meeting all other requirements     Today's Visits  Date Type Provider Dept   03/26/20 Telemedicine Dwight Plata today's visits and meeting all other requirements     Future Appointments  Date Type Provider Dept   03/26/20 Telemedicine Oma Plata [facility]   Showing future appointments within next 150 days and meeting all other requirements        After connecting through [employer], the patient was identified by name and date of birth  Natan Barrera was informed that this is a telemedicine visit and that the visit is being conducted through telephone which may not be secure and therefore, might not be HIPAA-compliant  My office door was closed  No one else was in the room  She acknowledged consent and understanding of privacy and security of the video platform  The patient has agreed to participate and understands they can discontinue the visit at any time  Subjective  Natan Barrera is a 25 y o  female with complaints of cough and chest congestion  Her mother was sick last week, thought it was allergies  Pt has been sick for the past 4 days    Started with sore throat, cough, and burning in her chest   The next day she developed productive cough, wheezing, and diarrhea  Last night she has chills and sweats  She took her temperature this morning and it was 100 1  It feels like bronchitis that she has had before  She denies any sinus congestion, but has some post nasal drip  She has been taking Robitussin or Mucinex, which does help  She had had pneumonia twice in the past     Had pneumonia has a young child and has used inhalers when she has been sick with bronchitis  No known exposure to anyone positive for COVID-19  Reports her mother's symptoms have resolved    Past Medical History:   Diagnosis Date    ADHD (attention deficit hyperactivity disorder)     Anxiety     Asthma     [facility]     Depression     Pneumonia        Past Surgical History:   Procedure Laterality Date    WISDOM TOOTH EXTRACTION         Current Outpatient Medications   Medication Sig Dispense Refill    albuterol (ProAir HFA) 90 mcg/act inhaler Inhale 2 puffs every 4 (four) hours as needed for wheezing or shortness of breath 8 5 g 0    azithromycin (ZITHROMAX) 250 mg tablet 2 tabs PO day 1, then 1 tab PO days 2-5 6 tablet 0    sertraline (ZOLOFT) 100 mg tablet       topiramate (TOPAMAX) 50 MG tablet daily as needed   1    VYVANSE 40 MG capsule 40 mg daily       No current facility-administered medications for this visit  Allergies   Allergen Reactions    Lorabid [Loracarbef] Other (See Comments)     Unknown since childhood       Review of Systems   Constitutional: Positive for chills, fatigue and fever  HENT: Positive for postnasal drip and sore throat  Negative for congestion, ear pain, rhinorrhea and sinus pressure  Respiratory: Positive for cough, chest tightness and wheezing  Negative for shortness of breath  Cardiovascular: Negative for chest pain  Gastrointestinal: Positive for diarrhea   Negative for abdominal pain, nausea and vomiting  Musculoskeletal: Negative for arthralgias  Skin: Negative for rash  Neurological: Negative for headaches  I spent 15 minutes with the patient during this visit

## 2021-05-13 ENCOUNTER — TELEPHONE (OUTPATIENT)
Dept: FAMILY MEDICINE CLINIC | Facility: CLINIC | Age: 26
End: 2021-05-13

## 2021-05-20 ENCOUNTER — OFFICE VISIT (OUTPATIENT)
Dept: URGENT CARE | Facility: CLINIC | Age: 26
End: 2021-05-20
Payer: COMMERCIAL

## 2021-05-20 VITALS
RESPIRATION RATE: 18 BRPM | SYSTOLIC BLOOD PRESSURE: 140 MMHG | HEIGHT: 66 IN | TEMPERATURE: 97.7 F | OXYGEN SATURATION: 100 % | HEART RATE: 80 BPM | BODY MASS INDEX: 42.33 KG/M2 | DIASTOLIC BLOOD PRESSURE: 80 MMHG | WEIGHT: 263.4 LBS
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
## 2021-05-20 DIAGNOSIS — S30.860A TICK BITE OF BACK, INITIAL ENCOUNTER: Primary | ICD-10-CM

## 2021-05-20 PROCEDURE — 3008F BODY MASS INDEX DOCD: CPT | Performed by: PHYSICIAN ASSISTANT

## 2021-05-20 PROCEDURE — 99213 OFFICE O/P EST LOW 20 MIN: CPT | Performed by: PHYSICIAN ASSISTANT

## 2021-05-20 RX ORDER — DROSPIRENONE AND ETHINYL ESTRADIOL 0.03MG-3MG
1 KIT ORAL DAILY
COMMUNITY
Start: 2021-02-25 | End: 2022-04-29

## 2021-05-20 RX ORDER — DOXYCYCLINE 100 MG/1
100 CAPSULE ORAL 2 TIMES DAILY
Qty: 28 CAPSULE | Refills: 0 | Status: SHIPPED | OUTPATIENT
Start: 2021-05-20 | End: 2021-06-03

## 2021-05-20 NOTE — PATIENT INSTRUCTIONS
Tick Bite   WHAT YOU NEED TO KNOW:   Most tick bites are not dangerous, but ticks can pass disease or infection when they bite  Ticks need to be removed quickly  You may have redness, pain, itching, and swelling near the bite  Blisters may also develop  DISCHARGE INSTRUCTIONS:   Return to the emergency department if:   · You have trouble walking or moving your legs  · You have joint pain, muscle pain, or muscle weakness within 1 month of a tick bite  · You have a fever, chills, headache, or rash  Contact your healthcare provider if:   · You cannot remove the tick  · The tick's head is stuck in your skin  · You have questions or concerns about your condition or care  Medicines:   · Medicines  help decrease pain, redness, itching, and swelling  You may also need medicine to prevent or fight a bacterial infection  These medicines may be given as a cream, lotion, or pill  · Take your medicine as directed  Contact your healthcare provider if you think your medicine is not helping or if you have side effects  Tell him of her if you are allergic to any medicine  Keep a list of the medicines, vitamins, and herbs you take  Include the amounts, and when and why you take them  Bring the list or the pill bottles to follow-up visits  Carry your medicine list with you in case of an emergency  How to remove a tick:  Remove the tick as soon as possible to help prevent disease or infection  You are less likely to get sick from a tick bite if you remove the tick within 24 hours  Do not use petroleum jelly, nail polish, rubbing alcohol, or heat  These do not work and may be dangerous  Do the following to remove a tick:  · First, try a soapy cotton ball  Soak a cotton ball in liquid soap  Cover the tick with the cotton ball for 30 seconds  The tick may come off with the cotton ball when you pull it away  · Use tweezers if the soapy cotton ball does not work   Grasp the tick as close to your skin as possible  Pull the tick straight up and out  Do not touch the tick with your bare hands  · Do not twist or jerk the tick suddenly, because this may break off the tick's head or mouth parts  Do not leave any part of the tick in your skin  · Do not crush or squeeze the tick since its body may be infected with germs  Flush the tick down the toilet  · After the tick is removed, clean the area of the bite with rubbing alcohol  Then wash your hands with soap and water  Apply ice  on your bite for 15 to 20 minutes every hour or as directed  Use an ice pack, or put crushed ice in a plastic bag  Cover it with a towel before you apply it to your skin  Ice helps prevent tissue damage and decreases swelling and pain  Prevent a tick bite:  Ticks live in areas covered by brush and grass  They may even be found in your lawn if you live in certain areas  Outdoor pets can carry ticks inside the house  Ticks can grab onto you or your clothes when you walk by grass or brush  If you go into areas that contain many trees, tall grasses, and underbrush, do the following:  · Wear light colored pants and a long-sleeved shirt  Tuck your pants into your socks or boots  Tuck in your shirt  Wear sleeves that fit close to the skin at your wrists and neck  This will help prevent ticks from crawling through gaps in your clothing and onto your skin  Wear a hat in areas with trees  · Apply insect repellant on your skin  The insect repellant should contain DEET  Do not put insect repellant on skin that is cut, scratched, or irritated  Always use soap and water to wash the insect repellant off as soon as possible once you are indoors  Do not apply insect repellant on your child's face or hands  · Spray insect repellant onto your clothes  Use permethrin spray  This spray kills ticks that crawl on your clothing  Be sure to spray the tops of your boots, bottom of pant legs, and sleeve cuffs   As soon as possible, wash and dry clothing in hot water and high heat  · Check your clothing, hair, and skin for ticks  Shower within 2 hours of coming indoors  Carefully check the hairline, armpits, neck, and waist  Check your pets and children for ticks  Remove ticks from pets the same way as you remove them from people  · Decrease the risk for ticks in your yard  Ticks like to live in shady, moist areas  Marlynn Marcelo your lawn regularly to keep the grass short  Trim the grass around birdbaths and fences  Cut branches that are overgrown and take them out of the yard  Clear out leaf piles  Boris Velha firewood in a dry, kinga area  Follow up with your healthcare provider as directed:  Write down your questions so you remember to ask them during your visits  © Copyright [address] Information is for End User's use only and may not be sold, redistributed or otherwise used for commercial purposes  All illustrations and images included in CareNotes® are the copyrighted property of A D A M , Inc  or [facility] Joshua maggie   The above information is an  only  It is not intended as medical advice for individual conditions or treatments  Talk to your doctor, nurse or pharmacist before following any medical regimen to see if it is safe and effective for you

## 2021-05-20 NOTE — PROGRESS NOTES
[facility]        NAME: John Hanna is a 22 y o  female  : 1995    MRN: [id number]  DATE: May 20, 2021  TIME: 12:56 PM    Assessment and Plan   Tick bite of back, initial encounter [S30 860A, W57  XXXA]  1  Tick bite of back, initial encounter  doxycycline monohydrate (MONODOX) 100 mg capsule         Patient Instructions     Patient Instructions   Tick Bite   WHAT YOU NEED TO KNOW:   Most tick bites are not dangerous, but ticks can pass disease or infection when they bite  Ticks need to be removed quickly  You may have redness, pain, itching, and swelling near the bite  Blisters may also develop  DISCHARGE INSTRUCTIONS:   Return to the emergency department if:   · You have trouble walking or moving your legs  · You have joint pain, muscle pain, or muscle weakness within 1 month of a tick bite  · You have a fever, chills, headache, or rash  Contact your healthcare provider if:   · You cannot remove the tick  · The tick's head is stuck in your skin  · You have questions or concerns about your condition or care  Medicines:   · Medicines  help decrease pain, redness, itching, and swelling  You may also need medicine to prevent or fight a bacterial infection  These medicines may be given as a cream, lotion, or pill  · Take your medicine as directed  Contact your healthcare provider if you think your medicine is not helping or if you have side effects  Tell him of her if you are allergic to any medicine  Keep a list of the medicines, vitamins, and herbs you take  Include the amounts, and when and why you take them  Bring the list or the pill bottles to follow-up visits  Carry your medicine list with you in case of an emergency  How to remove a tick:  Remove the tick as soon as possible to help prevent disease or infection  You are less likely to get sick from a tick bite if you remove the tick within 24 hours   Do not use petroleum jelly, nail polish, rubbing alcohol, or heat  These do not work and may be dangerous  Do the following to remove a tick:  · First, try a soapy cotton ball  Soak a cotton ball in liquid soap  Cover the tick with the cotton ball for 30 seconds  The tick may come off with the cotton ball when you pull it away  · Use tweezers if the soapy cotton ball does not work  Grasp the tick as close to your skin as possible  Pull the tick straight up and out  Do not touch the tick with your bare hands  · Do not twist or jerk the tick suddenly, because this may break off the tick's head or mouth parts  Do not leave any part of the tick in your skin  · Do not crush or squeeze the tick since its body may be infected with germs  Flush the tick down the toilet  · After the tick is removed, clean the area of the bite with rubbing alcohol  Then wash your hands with soap and water  Apply ice  on your bite for 15 to 20 minutes every hour or as directed  Use an ice pack, or put crushed ice in a plastic bag  Cover it with a towel before you apply it to your skin  Ice helps prevent tissue damage and decreases swelling and pain  Prevent a tick bite:  Ticks live in areas covered by brush and grass  They may even be found in your lawn if you live in certain areas  Outdoor pets can carry ticks inside the house  Ticks can grab onto you or your clothes when you walk by grass or brush  If you go into areas that contain many trees, tall grasses, and underbrush, do the following:  · Wear light colored pants and a long-sleeved shirt  Tuck your pants into your socks or boots  Tuck in your shirt  Wear sleeves that fit close to the skin at your wrists and neck  This will help prevent ticks from crawling through gaps in your clothing and onto your skin  Wear a hat in areas with trees  · Apply insect repellant on your skin  The insect repellant should contain DEET  Do not put insect repellant on skin that is cut, scratched, or irritated   Always use soap and water to wash the insect repellant off as soon as possible once you are indoors  Do not apply insect repellant on your child's face or hands  · Spray insect repellant onto your clothes  Use permethrin spray  This spray kills ticks that crawl on your clothing  Be sure to spray the tops of your boots, bottom of pant legs, and sleeve cuffs  As soon as possible, wash and dry clothing in hot water and high heat  · Check your clothing, hair, and skin for ticks  Shower within 2 hours of coming indoors  Carefully check the hairline, armpits, neck, and waist  Check your pets and children for ticks  Remove ticks from pets the same way as you remove them from people  · Decrease the risk for ticks in your yard  Ticks like to live in shady, moist areas  Willean Yolanda your lawn regularly to keep the grass short  Trim the grass around birdbaths and fences  Cut branches that are overgrown and take them out of the yard  Clear out leaf piles  Alvia Shines firewood in a dry, kinga area  Follow up with your healthcare provider as directed:  Write down your questions so you remember to ask them during your visits  © Copyright [address] Information is for End User's use only and may not be sold, redistributed or otherwise used for commercial purposes  All illustrations and images included in CareNotes® are the copyrighted property of A D A M , Inc  or [address] [place] [address]  The above information is an  only  It is not intended as medical advice for individual conditions or treatments  Talk to your doctor, nurse or pharmacist before following any medical regimen to see if it is safe and effective for you  Follow up with PCP in 3-5 days  Proceed to  ER if symptoms worsen  Chief Complaint     Chief Complaint   Patient presents with    Tick Bite     Removed a larger tick fromR back/scapular area yesterday  Not sure how long it wa son her - picked off easily but head was attached  Has red irritation surrounding bite pankaj  History of Present Illness       24-year-old female presents with a tick bite on her right upper flank almost  Axilla area that she noticed yesterday  Pt is unsure when the tick could have attached but she is almost certain it was greater than 24 hours  She found the tick while in the shower  Pt pulled the tick out and it was engorged but alive  She denies any headache, body aches, joint pain, dizziness, weakness or fatigue  Review of Systems   Review of Systems   Constitutional: Negative for chills, fatigue and fever  Respiratory: Negative for cough and shortness of breath  Cardiovascular: Negative for chest pain and palpitations  Gastrointestinal: Negative for diarrhea, nausea and vomiting  Skin: Positive for wound  Negative for rash  Neurological: Negative for dizziness, weakness and headaches           Current Medications       Current Outpatient Medications:     albuterol (ProAir HFA) 90 mcg/act inhaler, Inhale 2 puffs every 4 (four) hours as needed for wheezing or shortness of breath, Disp: 8 5 g, Rfl: 0    drospirenone-ethinyl estradiol (AGNES) 3-0 03 MG per tablet, Take 1 tablet by mouth daily, Disp: , Rfl:     VYVANSE 40 MG capsule, 40 mg daily, Disp: , Rfl:     doxycycline monohydrate (MONODOX) 100 mg capsule, Take 1 capsule (100 mg total) by mouth 2 (two) times a day for 14 days Take 2 pills now, Disp: 28 capsule, Rfl: 0    sertraline (ZOLOFT) 100 mg tablet, , Disp: , Rfl:     topiramate (TOPAMAX) 50 MG tablet, daily as needed , Disp: , Rfl: 1    Current Allergies     Allergies as of 05/20/2021 - Reviewed 05/20/2021   Allergen Reaction Noted    Lorabid [loracarbef] Other (See Comments) 07/21/2019            The following portions of the patient's history were reviewed and updated as appropriate: allergies, current medications, past family history, past medical history, past social history, past surgical history and problem list      Past Medical History:   Diagnosis Date  ADHD (attention deficit hyperactivity disorder)     Anxiety     Asthma     COPD (chronic obstructive pulmonary disease) (HCC)     Depression     Pneumonia     Urinary tract infection        Past Surgical History:   Procedure Laterality Date    WISDOM TOOTH EXTRACTION         Family History   Problem Relation Age of Onset    Hypertension Mother     COPD Father     Heart disease Father     Hypertension Father     Substance Abuse Neg Hx     Mental illness Neg Hx          Medications have been verified  Objective   /80   Pulse 80   Temp 97 7 °F (36 5 °C)   Resp 18   Ht 5' 6" (1 676 m)   Wt 119 kg (263 lb 6 4 oz)   LMP 05/12/2021 (Approximate)   SpO2 100%   BMI 42 51 kg/m²        Physical Exam     Physical Exam  Vitals signs and nursing note reviewed  Constitutional:       Appearance: Normal appearance  HENT:      Head: Normocephalic and atraumatic  Eyes:      Extraocular Movements: Extraocular movements intact  Cardiovascular:      Rate and Rhythm: Normal rate and regular rhythm  Pulses: Normal pulses  Heart sounds: Normal heart sounds  Pulmonary:      Effort: Pulmonary effort is normal       Breath sounds: Normal breath sounds  Skin:         Neurological:      Mental Status: She is alert and oriented to person, place, and time

## 2022-04-29 ENCOUNTER — INITIAL PRENATAL (OUTPATIENT)
Dept: OBGYN CLINIC | Facility: CLINIC | Age: 27
End: 2022-04-29

## 2022-04-29 VITALS — DIASTOLIC BLOOD PRESSURE: 78 MMHG | WEIGHT: 293 LBS | BODY MASS INDEX: 50.84 KG/M2 | SYSTOLIC BLOOD PRESSURE: 112 MMHG

## 2022-04-29 DIAGNOSIS — Z34.02 ENCOUNTER FOR SUPERVISION OF NORMAL FIRST PREGNANCY IN SECOND TRIMESTER: Primary | ICD-10-CM

## 2022-04-29 PROCEDURE — NOBC: Performed by: NURSE PRACTITIONER

## 2022-04-29 RX ORDER — ASPIRIN 81 MG/1
162 TABLET ORAL DAILY
COMMUNITY
End: 2022-07-29

## 2022-04-29 NOTE — PROGRESS NOTES
INITIAL OB VISIT: Pt presents as a new patient to our office transfer of care in [facility] at 26 6/7  She is transferring due to insurance  She was previously seen at [facility] and is up to date with labs and ultrasounds  Was advised repeat ultrasound in 4 weeks  Getting monthly growth scans due to having Covid in pregnancy  Medical History: Obesity, HSV,  ADHD, Anxiety, Depression, Varicella as child  Denies any hx of MRSA  Surgical History: [place] teeth    Medications: Prenatal with DHA, Aspirin 162mg daily, Magnesium, Vitamin D    Social History: Denies any alcohol, smoking, or drug use in pregnancy  Does NOT have cats  Has not been outside the country in the last 6 months  OB/GYN History: [facility]    Denies any N/V, HA, Cramping, VB, LOF, Edema, Domestic Violence, Smoking  + FM yet  Tolerating PNV  Consent and forms signed  [facility] form completed  Pregnancy essentials guide reviewed online  Plans on breastfeeding  Rx for 28wk labs given  Referral for MFM placed for growth scan, discussed will likely require NSTs due to BMI  RTO 3 weeks or sooner as needed

## 2022-05-03 LAB
BASOPHILS # BLD AUTO: 0 X10E3/UL (ref 0–0.2)
BASOPHILS NFR BLD AUTO: 0 %
EOSINOPHIL # BLD AUTO: 0.1 X10E3/UL (ref 0–0.4)
EOSINOPHIL NFR BLD AUTO: 0 %
ERYTHROCYTE [DISTWIDTH] IN BLOOD BY AUTOMATED COUNT: 11.9 % (ref 11.7–15.4)
GLUCOSE 1H P 50 G GLC PO SERPL-MCNC: 85 MG/DL (ref 65–139)
HCT VFR BLD AUTO: 36.1 % (ref 34–46.6)
HGB BLD-MCNC: 12.6 G/DL (ref 11.1–15.9)
IMM GRANULOCYTES # BLD: 0.2 X10E3/UL (ref 0–0.1)
IMM GRANULOCYTES NFR BLD: 2 %
LYMPHOCYTES # BLD AUTO: 2.1 X10E3/UL (ref 0.7–3.1)
LYMPHOCYTES NFR BLD AUTO: 16 %
MCH RBC QN AUTO: 31.9 PG (ref 26.6–33)
MCHC RBC AUTO-ENTMCNC: 34.9 G/DL (ref 31.5–35.7)
MCV RBC AUTO: 91 FL (ref 79–97)
MONOCYTES # BLD AUTO: 0.7 X10E3/UL (ref 0.1–0.9)
MONOCYTES NFR BLD AUTO: 6 %
NEUTROPHILS # BLD AUTO: 10.3 X10E3/UL (ref 1.4–7)
NEUTROPHILS NFR BLD AUTO: 76 %
PLATELET # BLD AUTO: 304 X10E3/UL (ref 150–450)
RBC # BLD AUTO: 3.95 X10E6/UL (ref 3.77–5.28)
RPR SER QL: NON REACTIVE
WBC # BLD AUTO: 13.5 X10E3/UL (ref 3.4–10.8)

## 2022-05-20 ENCOUNTER — ROUTINE PRENATAL (OUTPATIENT)
Dept: OBGYN CLINIC | Facility: CLINIC | Age: 27
End: 2022-05-20

## 2022-05-20 DIAGNOSIS — Z34.02 ENCOUNTER FOR SUPERVISION OF NORMAL FIRST PREGNANCY IN SECOND TRIMESTER: Primary | ICD-10-CM

## 2022-05-20 PROCEDURE — PNV: Performed by: NURSE PRACTITIONER

## 2022-05-20 NOTE — PROGRESS NOTES
OFFICE VISIT: Denies any N/V, HA, Cramping, VB, LOF, Edema, Domestic Violence, Smoking  + FM  Tolerating PNV  30 week folder given and reviewed  Has appt scheduled with MFM  Urine neg/neg  RTO 2 weeks or sooner as needed

## 2022-05-23 VITALS — WEIGHT: 293 LBS | DIASTOLIC BLOOD PRESSURE: 62 MMHG | BODY MASS INDEX: 52.29 KG/M2 | SYSTOLIC BLOOD PRESSURE: 110 MMHG

## 2022-06-03 ENCOUNTER — ROUTINE PRENATAL (OUTPATIENT)
Dept: OBGYN CLINIC | Facility: CLINIC | Age: 27
End: 2022-06-03
Payer: COMMERCIAL

## 2022-06-03 VITALS — SYSTOLIC BLOOD PRESSURE: 120 MMHG | WEIGHT: 293 LBS | BODY MASS INDEX: 52.62 KG/M2 | DIASTOLIC BLOOD PRESSURE: 80 MMHG

## 2022-06-03 DIAGNOSIS — Z3A.32 32 WEEKS GESTATION OF PREGNANCY: Primary | ICD-10-CM

## 2022-06-03 DIAGNOSIS — Z34.03 ENCOUNTER FOR SUPERVISION OF LOW-RISK FIRST PREGNANCY IN THIRD TRIMESTER: ICD-10-CM

## 2022-06-03 PROCEDURE — 99213 OFFICE O/P EST LOW 20 MIN: CPT | Performed by: OBSTETRICS & GYNECOLOGY

## 2022-06-03 PROCEDURE — 90471 IMMUNIZATION ADMIN: CPT | Performed by: OBSTETRICS & GYNECOLOGY

## 2022-06-03 PROCEDURE — 90715 TDAP VACCINE 7 YRS/> IM: CPT | Performed by: OBSTETRICS & GYNECOLOGY

## 2022-06-03 NOTE — PROGRESS NOTES
Visit:  Good FM - has f /u US for growth scheduled - to get TDAp today - mother with patient so did not review HSV and consider start valtrex at next visit -

## 2022-06-08 ENCOUNTER — TELEPHONE (OUTPATIENT)
Dept: OBGYN CLINIC | Facility: CLINIC | Age: 27
End: 2022-06-08

## 2022-06-08 ENCOUNTER — ROUTINE PRENATAL (OUTPATIENT)
Dept: PERINATAL CARE | Facility: CLINIC | Age: 27
End: 2022-06-08
Payer: COMMERCIAL

## 2022-06-08 VITALS
DIASTOLIC BLOOD PRESSURE: 83 MMHG | HEIGHT: 66 IN | SYSTOLIC BLOOD PRESSURE: 128 MMHG | HEART RATE: 107 BPM | BODY MASS INDEX: 47.09 KG/M2 | WEIGHT: 293 LBS

## 2022-06-08 DIAGNOSIS — O09.93 HIGH-RISK PREGNANCY IN THIRD TRIMESTER: ICD-10-CM

## 2022-06-08 DIAGNOSIS — O99.213 OBESITY DURING PREGNANCY IN THIRD TRIMESTER: Primary | ICD-10-CM

## 2022-06-08 DIAGNOSIS — Z3A.32 32 WEEKS GESTATION OF PREGNANCY: ICD-10-CM

## 2022-06-08 DIAGNOSIS — O26.03 EXCESSIVE WEIGHT GAIN DURING PREGNANCY IN THIRD TRIMESTER: ICD-10-CM

## 2022-06-08 PROBLEM — F90.9 ADHD: Status: ACTIVE | Noted: 2022-06-08

## 2022-06-08 PROCEDURE — 76816 OB US FOLLOW-UP PER FETUS: CPT | Performed by: OBSTETRICS & GYNECOLOGY

## 2022-06-08 PROCEDURE — 99204 OFFICE O/P NEW MOD 45 MIN: CPT | Performed by: OBSTETRICS & GYNECOLOGY

## 2022-06-08 NOTE — TELEPHONE ENCOUNTER
----- Message from Ewa Kowalski MD sent at 6/3/2022  2:22 PM EDT -----  Patient will need to start on Valtrex at 34 weeks due to history of HSV - she was accompanied by her mom for this visit so I didn't want to address if mom was unaware - perhaps call and review and send script - thx

## 2022-06-08 NOTE — LETTER
2022     Sherif [facility], [address] [facility]    Patient: Oscar Holman   YOB: 1995   Date of Visit: 2022       Dear Dr Valeriy Figueroa: Thank you for referring Atiya Alvarado to me for evaluation  Below are my notes for this consultation  If you have questions, please do not hesitate to call me  I look forward to following your patient along with you  Sincerely,        Sushila Escobedo MD        CC: No Recipients  Sushila Escobedo MD  2022  8:48 PM  Sign when Signing Visit  OFFICE CONSULT  Sherif [facility]aptiste, [place]  [address] [address],[address]  [address]     Dear Dell Solares     Thank you for requesting a  consultation on your patient Oscar Holman for the following indications:  Late transfer of care secondary to insurance and a scan for growth  Problem list:  1  Pre gravid BMI of 41  2  Excessive weight gain 73 pounds-her Glucola screen on 22 was 85    History  Past medical history:  Depression and ADHD, pre gravid BMI of 41 and genital herpes  Past surgical history:  [place] tooth extraction  Medications:  Aspirin 162 milligrams daily, vitamin-D and prenatal vitamins  Allergies to medications:  Lorabid  Past obstetrical history:  17 10 week VIP by D&E  Social history:  Denies substance use  First generation family history:  History of hypertension    Ultrasound findings: The ultrasound today shows a fetus that is growing in the 87th percentile with a fetal abdomen in the 92nd percentile  The anatomical survey was limited secondary to late gestational age, maternal skin thickness and fetal position  She had a previous 20 week ultrasound that was completed through [facility] and no malformations were detected    Biophysical profile was     The patient was informed of the findings and counseled about the limitations of the exam in detecting all forms of fetal congenital abnormalities  She does not report any vaginal bleeding or uterine cramping/contractions  She does feel fetal movement  Specific counseling was provided on the following problems:  1  Her risk factors to develop a large baby include a family history of large babies, excessive weight gain and and her elevated pre gravid BMI  Follow up recommended:   Recommend a follow-up ultrasound in 4 weeks for growth and missed anatomy  Due to her elevated pre gravid BMI of 41 recommend she be started on weekly nonstress tests/fluid scans at 34 weeks  Recommend daily fetal kick counts  She lost her father during this pregnancy  We discussed the option of using the Behavioral [facility] if she feels her depression is not controlled     She reports she does have a psychiatrist if she has a need to restart medication  Based on her excessive weight gain recommend her OB office consider referral for nutrition counseling  Pre visit time reviewing her records  15 minutes  Face to face time 15 minutes  Post visit time on documentation of note, updating her problem list, adding orders and prescriptions 10 minutes  Procedures that were completed today were charged separately  The level of decision making was low level complexity      Glorietta Seip, MD

## 2022-06-09 NOTE — PROGRESS NOTES
OFFICE CONSULT  Don[place], [place]  [address]  [address]     Dear Clarisa Leonardo     Thank you for requesting a  consultation on your patient Dougie Ridleobardo for the following indications:  Late transfer of care secondary to insurance and a scan for growth  Problem list:  1  Pre gravid BMI of 41  2  Excessive weight gain 73 pounds-her Glucola screen on 22 was 85    History  Past medical history:  Depression and ADHD, pre gravid BMI of 41 and genital herpes  Past surgical history:  [place] tooth extraction  Medications:  Aspirin 162 milligrams daily, vitamin-D and prenatal vitamins  Allergies to medications:  Lorabid  Past obstetrical history:  17 10 week VIP by D&E  Social history:  Denies substance use  First generation family history:  History of hypertension    Ultrasound findings: The ultrasound today shows a fetus that is growing in the 87th percentile with a fetal abdomen in the 92nd percentile  The anatomical survey was limited secondary to late gestational age, maternal skin thickness and fetal position  She had a previous 20 week ultrasound that was completed through [facility] and no malformations were detected  Biophysical profile was     The patient was informed of the findings and counseled about the limitations of the exam in detecting all forms of fetal congenital abnormalities  She does not report any vaginal bleeding or uterine cramping/contractions  She does feel fetal movement  Specific counseling was provided on the following problems:  1  Her risk factors to develop a large baby include a family history of large babies, excessive weight gain and and her elevated pre gravid BMI  Follow up recommended:   Recommend a follow-up ultrasound in 4 weeks for growth and missed anatomy  Due to her elevated pre gravid BMI of 41 recommend she be started on weekly nonstress tests/fluid scans at 34 weeks    Recommend daily fetal kick counts  She lost her father during this pregnancy  We discussed the option of using the Behavioral counselor [facility] if she feels her depression is not controlled     She reports she does have a psychiatrist if she has a need to restart medication  Based on her excessive weight gain recommend her OB office consider referral for nutrition counseling  Pre visit time reviewing her records  15 minutes  Face to face time 15 minutes  Post visit time on documentation of note, updating her problem list, adding orders and prescriptions 10 minutes  Procedures that were completed today were charged separately  The level of decision making was low level complexity      Carmen Amado MD

## 2022-06-14 DIAGNOSIS — B00.9 HERPES: Primary | ICD-10-CM

## 2022-06-14 RX ORDER — VALACYCLOVIR HYDROCHLORIDE 500 MG/1
500 TABLET, FILM COATED ORAL DAILY
Qty: 30 TABLET | Refills: 1 | Status: SHIPPED | OUTPATIENT
Start: 2022-06-14 | End: 2022-06-17 | Stop reason: SDUPTHER

## 2022-06-17 ENCOUNTER — ROUTINE PRENATAL (OUTPATIENT)
Dept: OBGYN CLINIC | Facility: CLINIC | Age: 27
End: 2022-06-17

## 2022-06-17 VITALS — SYSTOLIC BLOOD PRESSURE: 126 MMHG | WEIGHT: 293 LBS | BODY MASS INDEX: 53.36 KG/M2 | DIASTOLIC BLOOD PRESSURE: 84 MMHG

## 2022-06-17 DIAGNOSIS — Z3A.33 33 WEEKS GESTATION OF PREGNANCY: ICD-10-CM

## 2022-06-17 DIAGNOSIS — Z34.93 PRENATAL CARE IN THIRD TRIMESTER: Primary | ICD-10-CM

## 2022-06-17 DIAGNOSIS — B00.9 HERPES: ICD-10-CM

## 2022-06-17 PROCEDURE — PNV: Performed by: STUDENT IN AN ORGANIZED HEALTH CARE EDUCATION/TRAINING PROGRAM

## 2022-06-17 RX ORDER — VALACYCLOVIR HYDROCHLORIDE 500 MG/1
1000 TABLET, FILM COATED ORAL DAILY
Qty: 30 TABLET | Refills: 1 | Status: SHIPPED | OUTPATIENT
Start: 2022-06-17 | End: 2022-07-01 | Stop reason: SDUPTHER

## 2022-06-17 NOTE — PROGRESS NOTES
Juan Sands is a 33 yo  at 33 weeks and 6 days presenting for routine PNC- denies any CTX, LOF or VB  She reports good FM  Urine trace protein, negative glucose  EFW last growth scan was 87 % on - will follow up growth in 4 weeks and begin weekly ANFS next week  Briefly reviewed IOL at 39 weeks as patient brought up  center had recommended patient would like to avoid if possible  Reviewed risks and benefits of IOL vs expectant management if concern for LGA  We agreed we would wait to see what the next u/s showed  Will start valtrex daily for HSV Ppx  Reviewed GBS at next visit  RTO in 2 weeks or sooner if needed

## 2022-06-21 ENCOUNTER — ULTRASOUND (OUTPATIENT)
Dept: PERINATAL CARE | Facility: CLINIC | Age: 27
End: 2022-06-21
Payer: COMMERCIAL

## 2022-06-21 VITALS
DIASTOLIC BLOOD PRESSURE: 64 MMHG | BODY MASS INDEX: 47.09 KG/M2 | HEIGHT: 66 IN | HEART RATE: 99 BPM | WEIGHT: 293 LBS | SYSTOLIC BLOOD PRESSURE: 136 MMHG

## 2022-06-21 DIAGNOSIS — Z3A.34 34 WEEKS GESTATION OF PREGNANCY: ICD-10-CM

## 2022-06-21 DIAGNOSIS — O99.213 OBESITY DURING PREGNANCY IN THIRD TRIMESTER: Primary | ICD-10-CM

## 2022-06-21 PROCEDURE — 76815 OB US LIMITED FETUS(S): CPT | Performed by: OBSTETRICS & GYNECOLOGY

## 2022-06-21 PROCEDURE — 59025 FETAL NON-STRESS TEST: CPT | Performed by: NURSE PRACTITIONER

## 2022-06-21 NOTE — PROGRESS NOTES
[address] [address] [address]: Ms Columbus Eisenmenger was seen today for NST (found under the pregnancy episode) which I reviewed the RN assessment and agree, and KIARA (see ultrasound report under OB procedures tab)  See ultrasound report under "OB Procedures" tab     Please don't hesitate to contact our office with any concerns or questions   -PHILIPPE Thayer

## 2022-06-21 NOTE — PATIENT INSTRUCTIONS
Thank you for choosing us for your  care today  If you have any questions about your ultrasound or care, please do not hesitate to contact us or your primary obstetrician  Some general instructions for your pregnancy are:    Protect against coronavirus: get vaccinated - pregnant women are increased risk of severe COVID  Notify your primary care doctor if you have any symptoms  Exercise: Aim for 22 minutes per day (150 minutes per week) of regular exercise  Walking is great! Nutrition: aim for calcium-rich and iron-rich foods as well as healthy sources of protein  Learn about Preeclampsia: preeclampsia is a common, serious high blood pressure complication in pregnancy  A blood pressure of 370NZIP (systolic or top number) or 69WSFB (diastolic or bottom number) is not normal and needs evaluation by your doctor  Aspirin is sometimes prescribed in early pregnancy to prevent preeclampsia in women with risk factors - ask your obstetrician if you should be on this medication  If you smoke, try to reduce how many cigarettes you smoke or try to quit completely  Do not vape  Other warning signs to watch out for in pregnancy or postpartum: chest pain, obstructed breathing or shortness of breath, seizures, thoughts of hurting yourself or your baby, bleeding, a painful or swollen leg, fever, or headache (see AWHONN POST-BIRTH Warning Signs campaign)  If these happen call 911  Itching is also not normal in pregnancy and if you experience this, especially over your hands and feet, potentially worse at night, notify your doctors

## 2022-06-21 NOTE — LETTER
NST sleeve cover sheet    Patient name: Vida Todd  : 1995  MRN: [id number]    HAILEY: Estimated Date of Delivery: 22    Obstetric[facility]: [facility]       Reason(s) for testing: elevated BMI   __________________________________________      Testing frequency:    ___ 2x/wk  _x__ 1x/wk  ___ Dopplers  ___ BPP?       Last growth scan: __________________________________________

## 2022-06-21 NOTE — PROGRESS NOTES
Non-Stress Testing:    Non-Stress test, equipment, procedure, and expected outcomes reviewed  Reviewed fetal kick counts and when to call OB  Arpit Pandya Verified patient understanding of fetal kick counts with teach back method  Patient reports feeling daily fetal movements  Patient has no questions or concerns  Wilma PAEZ viewed NST strip prior to completion of visit

## 2022-06-28 ENCOUNTER — ULTRASOUND (OUTPATIENT)
Dept: PERINATAL CARE | Facility: CLINIC | Age: 27
End: 2022-06-28
Payer: COMMERCIAL

## 2022-06-28 VITALS
HEART RATE: 85 BPM | BODY MASS INDEX: 47.09 KG/M2 | HEIGHT: 66 IN | DIASTOLIC BLOOD PRESSURE: 55 MMHG | WEIGHT: 293 LBS | SYSTOLIC BLOOD PRESSURE: 114 MMHG

## 2022-06-28 DIAGNOSIS — Z3A.35 35 WEEKS GESTATION OF PREGNANCY: ICD-10-CM

## 2022-06-28 DIAGNOSIS — O99.213 OBESITY DURING PREGNANCY IN THIRD TRIMESTER: Primary | ICD-10-CM

## 2022-06-28 PROCEDURE — 59025 FETAL NON-STRESS TEST: CPT | Performed by: OBSTETRICS & GYNECOLOGY

## 2022-06-28 PROCEDURE — 76815 OB US LIMITED FETUS(S): CPT | Performed by: OBSTETRICS & GYNECOLOGY

## 2022-06-28 NOTE — PROGRESS NOTES
Repeat Non-Stress Testing:    Pt verbalizes +FM  Pt denies ALL:               Leaking of fluid   Contractions   Vaginal bleeding   Decreased fetal movement    Patient has no questions or concerns  Dr Rui Marshall viewed NST strip prior to completion of visit

## 2022-06-28 NOTE — PATIENT INSTRUCTIONS
Thank you for choosing us for your  care today  If you have any questions about your ultrasound or care, please do not hesitate to contact us or your primary obstetrician  Some general instructions for your pregnancy are:    Protect against coronavirus: get vaccinated - pregnant women are increased risk of severe COVID  Notify your primary care doctor if you have any symptoms  Exercise: Aim for 22 minutes per day (150 minutes per week) of regular exercise  Walking is great! Nutrition: aim for calcium-rich and iron-rich foods as well as healthy sources of protein  Learn about Preeclampsia: preeclampsia is a common, serious high blood pressure complication in pregnancy  A blood pressure of 800ELAR (systolic or top number) or 44JIRO (diastolic or bottom number) is not normal and needs evaluation by your doctor  Aspirin is sometimes prescribed in early pregnancy to prevent preeclampsia in women with risk factors - ask your obstetrician if you should be on this medication  If you smoke, try to reduce how many cigarettes you smoke or try to quit completely  Do not vape  Other warning signs to watch out for in pregnancy or postpartum: chest pain, obstructed breathing or shortness of breath, seizures, thoughts of hurting yourself or your baby, bleeding, a painful or swollen leg, fever, or headache (see AWHONN POST-BIRTH Warning Signs campaign)  If these happen call 911  Itching is also not normal in pregnancy and if you experience this, especially over your hands and feet, potentially worse at night, notify your doctors

## 2022-06-29 NOTE — PROGRESS NOTES
[address] [facility]: Ms Sonam Reardon was seen today for NST (found under the pregnancy episode) which I reviewed the RN assessment and agree, and KIARA (see ultrasound report under OB procedures tab)  Please don't hesitate to contact our office with any concerns or questions    Nila Hobbs MD

## 2022-07-01 ENCOUNTER — ROUTINE PRENATAL (OUTPATIENT)
Dept: OBGYN CLINIC | Facility: CLINIC | Age: 27
End: 2022-07-01

## 2022-07-01 VITALS — DIASTOLIC BLOOD PRESSURE: 80 MMHG | SYSTOLIC BLOOD PRESSURE: 140 MMHG | BODY MASS INDEX: 54.23 KG/M2 | WEIGHT: 293 LBS

## 2022-07-01 DIAGNOSIS — B00.9 HERPES: ICD-10-CM

## 2022-07-01 PROCEDURE — PNV: Performed by: OBSTETRICS & GYNECOLOGY

## 2022-07-01 PROCEDURE — 87150 DNA/RNA AMPLIFIED PROBE: CPT | Performed by: OBSTETRICS & GYNECOLOGY

## 2022-07-01 RX ORDER — VALACYCLOVIR HYDROCHLORIDE 500 MG/1
1000 TABLET, FILM COATED ORAL DAILY
Qty: 60 TABLET | Refills: 1 | Status: SHIPPED | OUTPATIENT
Start: 2022-07-01 | End: 2022-08-02

## 2022-07-01 NOTE — PROGRESS NOTES
Patient reports good fm, no n/v, headache, cramping, bleeding, loss of fluid, edema, dom violence, or smoking  benoit pnv    Patient on 500 bid of valtrex requests renewal, done, has pnc follow up growth, gbs done today return in 1 week for labor talk

## 2022-07-03 LAB — GP B STREP DNA SPEC QL NAA+PROBE: NEGATIVE

## 2022-07-06 ENCOUNTER — ULTRASOUND (OUTPATIENT)
Dept: PERINATAL CARE | Facility: OTHER | Age: 27
End: 2022-07-06
Payer: COMMERCIAL

## 2022-07-06 ENCOUNTER — ROUTINE PRENATAL (OUTPATIENT)
Dept: PERINATAL CARE | Facility: OTHER | Age: 27
End: 2022-07-06
Payer: COMMERCIAL

## 2022-07-06 VITALS
BODY MASS INDEX: 47.09 KG/M2 | WEIGHT: 293 LBS | DIASTOLIC BLOOD PRESSURE: 71 MMHG | HEIGHT: 66 IN | HEART RATE: 96 BPM | SYSTOLIC BLOOD PRESSURE: 108 MMHG

## 2022-07-06 DIAGNOSIS — Z3A.36 36 WEEKS GESTATION OF PREGNANCY: ICD-10-CM

## 2022-07-06 DIAGNOSIS — O99.213 OBESITY DURING PREGNANCY IN THIRD TRIMESTER: Primary | ICD-10-CM

## 2022-07-06 DIAGNOSIS — O26.03 EXCESSIVE WEIGHT GAIN DURING PREGNANCY IN THIRD TRIMESTER: ICD-10-CM

## 2022-07-06 PROCEDURE — 99213 OFFICE O/P EST LOW 20 MIN: CPT | Performed by: OBSTETRICS & GYNECOLOGY

## 2022-07-06 PROCEDURE — 76818 FETAL BIOPHYS PROFILE W/NST: CPT | Performed by: OBSTETRICS & GYNECOLOGY

## 2022-07-06 PROCEDURE — 76816 OB US FOLLOW-UP PER FETUS: CPT | Performed by: OBSTETRICS & GYNECOLOGY

## 2022-07-06 PROCEDURE — NC001 PR NO CHARGE: Performed by: OBSTETRICS & GYNECOLOGY

## 2022-07-06 NOTE — LETTER
NST sleeve cover sheet    Patient name: Cristian Costa  : 1995  MRN: [id number]    HAILEY: Estimated Date of Delivery: 22    Obstetrician: _[facility]_    Reason(s) for testing: MO  __________________________________________      Testing frequency:    ___ 2x/wk  __x_ 1x/wk  ___ Dopplers  ___ BPP?       Last growth scan: __________________________________________

## 2022-07-06 NOTE — PATIENT INSTRUCTIONS
Kick Counts in Pregnancy   AMBULATORY CARE:   Kick counts  measure how much your baby is moving in your womb  A kick from your baby can be felt as a twist, turn, swish, roll, or jab  It is common to feel your baby kicking at 26 to 28 weeks of pregnancy  You may feel your baby kick as early as 20 weeks of pregnancy  You may want to start counting at 28 weeks  Contact your doctor immediately if:   You feel a change in the number of kicks or movements of your baby  You feel fewer than 10 kicks within 2 hours  You have questions or concerns about your baby's movements  Why measure kick counts:  Your baby's movement may provide information about your baby's health  He or she may move less, or not at all, if there are problems  Your baby may move less if he or she is not getting enough oxygen or nutrition from the placenta  Do not smoke while you are pregnant  Smoking decreases the amount of oxygen that gets to your baby  Talk to your healthcare provider if you need help to quit smoking  Tell your healthcare provider as soon as you feel a change in your baby's movements  When to measure kick counts:   Measure kick counts at the same time every day  Measure kick counts when your baby is awake and most active  Your baby may be most active in the evening  How to measure kick counts:  Check that your baby is awake before you measure kick counts  You can wake up your baby by lightly pushing on your belly, walking, or drinking something cold  Your healthcare provider may tell you different ways to measure kick counts  You may be told to do the following:  Use a chart or clock to keep track of the time you start and finish counting  Sit in a chair or lie on your left side  Place your hands on the largest part of your belly  Count until you reach 10 kicks  Write down how much time it takes to count 10 kicks  It may take 30 minutes to 2 hours to count 10 kicks   It should not take more than 2 hours to count 10 kicks  Follow up with your doctor as directed:  Write down your questions so you remember to ask them during your visits  © Copyright [employer] 2022 Information is for End User's use only and may not be sold, redistributed or otherwise used for commercial purposes  All illustrations and images included in CareNotes® are the copyrighted property of A D A M , Inc  or [facility] Joshua Salter   The above information is an  only  It is not intended as medical advice for individual conditions or treatments  Talk to your doctor, nurse or pharmacist before following any medical regimen to see if it is safe and effective for you

## 2022-07-07 ENCOUNTER — ROUTINE PRENATAL (OUTPATIENT)
Dept: OBGYN CLINIC | Facility: CLINIC | Age: 27
End: 2022-07-07

## 2022-07-07 VITALS — WEIGHT: 293 LBS | DIASTOLIC BLOOD PRESSURE: 80 MMHG | BODY MASS INDEX: 54.72 KG/M2 | SYSTOLIC BLOOD PRESSURE: 114 MMHG

## 2022-07-07 DIAGNOSIS — Z86.19 HISTORY OF HERPES GENITALIS: ICD-10-CM

## 2022-07-07 DIAGNOSIS — Z3A.36 36 WEEKS GESTATION OF PREGNANCY: Primary | ICD-10-CM

## 2022-07-07 PROCEDURE — PNV: Performed by: STUDENT IN AN ORGANIZED HEALTH CARE EDUCATION/TRAINING PROGRAM

## 2022-07-07 NOTE — PATIENT INSTRUCTIONS
Pregnancy at 28 to 38 Weeks   AMBULATORY CARE:   Changes happening with your body: You are considered full term at the beginning of 37 weeks  Your breathing may be easier if your baby has moved down into a head-down position  You may need to urinate more often because the baby may be pressing on your bladder  You may also feel more discomfort and get tired easily  Seek care immediately if:   You develop a severe headache that does not go away  You have new or increased vision changes, such as blurred or spotted vision  You have new or increased swelling in your face or hands  You have vaginal spotting or bleeding  Your water broke or you feel warm water gushing or trickling from your vagina  Call your obstetrician if:   You have more than 5 contractions in 1 hour  You notice any changes in your baby's movements  You have abdominal cramps, pressure, or tightening  You have a change in vaginal discharge  You have chills or a fever  You have vaginal itching, burning, or pain  You have yellow, green, white, or foul-smelling vaginal discharge  You have pain or burning when you urinate, less urine than usual, or pink or bloody urine  You have questions or concerns about your condition or care  How to care for yourself at this stage of your pregnancy:       Eat a variety of healthy foods  Healthy foods include fruits, vegetables, whole-grain breads, low-fat dairy foods, beans, lean meats, and fish  Drink liquids as directed  Ask how much liquid to drink each day and which liquids are best for you  Limit caffeine to less than 200 milligrams each day  Limit your intake of fish to 2 servings each week  Choose fish low in mercury such as canned light tuna, shrimp, salmon, cod, or tilapia  Do not  eat fish high in mercury such as swordfish, tilefish, lillian mackerel, and shark  Take prenatal vitamins as directed    Your need for certain vitamins and minerals, such as folic acid, increases during pregnancy  Prenatal vitamins provide some of the extra vitamins and minerals you need  Prenatal vitamins may also help to decrease the risk of certain birth defects  Rest as needed  Put your feet up if you have swelling in your ankles and feet  Talk to your healthcare provider about exercise  Moderate exercise can help you stay fit  Your healthcare provider will help you plan an exercise program that is safe for you during pregnancy  Do not smoke  Smoking increases your risk of a miscarriage and other health problems during your pregnancy  Smoking can cause your baby to be born early or weigh less at birth  Ask your healthcare provider for information if you need help quitting  Do not drink alcohol  Alcohol passes from your body to your baby through the placenta  It can affect your baby's brain development and cause fetal alcohol syndrome (FAS)  FAS is a group of conditions that causes mental, behavior, and growth problems  Talk to your healthcare provider before you take any medicines  Many medicines may harm your baby if you take them when you are pregnant  Do not take any medicines, vitamins, herbs, or supplements without first talking to your healthcare provider  Never use illegal or street drugs (such as marijuana or cocaine) while you are pregnant  Safety tips during pregnancy:   Avoid hot tubs and saunas  Do not use a hot tub or sauna while you are pregnant, especially during your first trimester  Hot tubs and saunas may raise your baby's temperature and increase the risk of birth defects  Avoid toxoplasmosis  This is an infection caused by eating raw meat or being around infected cat feces  It can cause birth defects, miscarriages, and other problems  Wash your hands after you touch raw meat  Make sure any meat is well-cooked before you eat it  Avoid raw eggs and unpasteurized milk   Use gloves or ask someone else to clean your cat's litter box while you are pregnant  Ask your healthcare provider about travel  The most comfortable time to travel is during the second trimester  Ask your provider if you can travel after 36 weeks  You may not be able to travel in an airplane after 36 weeks  He or she may also recommend you avoid long road trips  Changes happening with your baby:  By 38 weeks, your baby may weigh between 6 and 9 pounds  Your baby may be about 14 inches long from the top of the head to the rump (baby's bottom)  Your baby hears well enough to know your voice  As your baby gets larger, you may feel fewer kicks and more stretching and rolling  Your baby may move into a head-down position  Your baby will also rest lower in your abdomen  What you need to know about prenatal care: Your healthcare provider will check your blood pressure and weight  You may also need the following:  A urine test  may also be done to check for sugar and protein  These can be signs of gestational diabetes or infection  Protein in your urine may also be a sign of preeclampsia  Preeclampsia is a condition that can develop during week 20 or later of your pregnancy  It causes high blood pressure, and it can cause problems with your kidneys and other organs  A gestational diabetes screen  may be done  Your healthcare provider may order either a 1-step or 2-step oral glucose tolerance test (OGTT)  1-step OGTT:  Your blood sugar level will be tested after you have not eaten for 8 hours (fasting)  You will then be given a glucose drink  Your level will be tested again 1 hour and 2 hours after you finish the drink  2-step OGTT:  You do not have to fast for the first part of the test  You will have the glucose drink at any time of day  Your blood sugar level will be checked 1 hour later  If your blood sugar is higher than a certain level, another test will be ordered  You will fast and your blood sugar level will be tested  You will have the glucose drink  Your blood will be tested again 1 hour, 2 hours, and 3 hours after you finish the glucose drink  A blood test  may be done to check for anemia (low iron level)  A Tdap vaccine  may be recommended by your healthcare provider  A group B strep test  is a test that is done to check for group B strep infection  Group B strep is a type of bacteria that may be found in the vagina or rectum  It can be passed to your baby during delivery if you have it  Your healthcare provider will take swab your vagina or rectum and send the sample to the lab for tests  Fundal height  is a measurement of your uterus to check your baby's growth  This number is usually the same as the number of weeks that you have been pregnant  Your healthcare provider may also check your baby's position  Your baby's heart rate  will be checked  Follow up with your obstetrician as directed:  Write down your questions so you remember to ask them during your visits  © Copyright [employer] 2022 Information is for End User's use only and may not be sold, redistributed or otherwise used for commercial purposes  All illustrations and images included in CareNotes® are the copyrighted property of [employer]  or [facility] Joshua Salter   The above information is an  only  It is not intended as medical advice for individual conditions or treatments  Talk to your doctor, nurse or pharmacist before following any medical regimen to see if it is safe and effective for you

## 2022-07-14 ENCOUNTER — ROUTINE PRENATAL (OUTPATIENT)
Dept: OBGYN CLINIC | Facility: CLINIC | Age: 27
End: 2022-07-14

## 2022-07-14 VITALS — BODY MASS INDEX: 54.55 KG/M2 | DIASTOLIC BLOOD PRESSURE: 70 MMHG | SYSTOLIC BLOOD PRESSURE: 120 MMHG | WEIGHT: 293 LBS

## 2022-07-14 DIAGNOSIS — Z3A.37 37 WEEKS GESTATION OF PREGNANCY: Primary | ICD-10-CM

## 2022-07-14 PROCEDURE — PNV: Performed by: STUDENT IN AN ORGANIZED HEALTH CARE EDUCATION/TRAINING PROGRAM

## 2022-07-14 NOTE — PROGRESS NOTES
32 y o   37w5d  Reports ++FM, no LOF, VB, or contractions       Vitals:    22 1541   BP: 120/70   Gen: no acute distress, nonlabored breathing  Fundal Height (cm): 42 cm  Fetal Heart Rate: 150  SVE: fetal head at- 2, cervix very posterior, limited exam due to maternal discomfort and low fetal head    A/P:  GBS negative  Hx of genital herpes, no symptoms, on valtrex 1000QD  Discussed term labor precautions  Return to office in 1 weeks

## 2022-07-14 NOTE — PATIENT INSTRUCTIONS
Pregnancy at 28 to 38 Weeks   AMBULATORY CARE:   Changes happening with your body: You are considered full term at the beginning of 37 weeks  Your breathing may be easier if your baby has moved down into a head-down position  You may need to urinate more often because the baby may be pressing on your bladder  You may also feel more discomfort and get tired easily  Seek care immediately if:   You develop a severe headache that does not go away  You have new or increased vision changes, such as blurred or spotted vision  You have new or increased swelling in your face or hands  You have vaginal spotting or bleeding  Your water broke or you feel warm water gushing or trickling from your vagina  Call your obstetrician if:   You have more than 5 contractions in 1 hour  You notice any changes in your baby's movements  You have abdominal cramps, pressure, or tightening  You have a change in vaginal discharge  You have chills or a fever  You have vaginal itching, burning, or pain  You have yellow, green, white, or foul-smelling vaginal discharge  You have pain or burning when you urinate, less urine than usual, or pink or bloody urine  You have questions or concerns about your condition or care  How to care for yourself at this stage of your pregnancy:       Eat a variety of healthy foods  Healthy foods include fruits, vegetables, whole-grain breads, low-fat dairy foods, beans, lean meats, and fish  Drink liquids as directed  Ask how much liquid to drink each day and which liquids are best for you  Limit caffeine to less than 200 milligrams each day  Limit your intake of fish to 2 servings each week  Choose fish low in mercury such as canned light tuna, shrimp, salmon, cod, or tilapia  Do not  eat fish high in mercury such as swordfish, tilefish, lillian mackerel, and shark  Take prenatal vitamins as directed    Your need for certain vitamins and minerals, such as folic acid, increases during pregnancy  Prenatal vitamins provide some of the extra vitamins and minerals you need  Prenatal vitamins may also help to decrease the risk of certain birth defects  Rest as needed  Put your feet up if you have swelling in your ankles and feet  Talk to your healthcare provider about exercise  Moderate exercise can help you stay fit  Your healthcare provider will help you plan an exercise program that is safe for you during pregnancy  Do not smoke  Smoking increases your risk of a miscarriage and other health problems during your pregnancy  Smoking can cause your baby to be born early or weigh less at birth  Ask your healthcare provider for information if you need help quitting  Do not drink alcohol  Alcohol passes from your body to your baby through the placenta  It can affect your baby's brain development and cause fetal alcohol syndrome (FAS)  FAS is a group of conditions that causes mental, behavior, and growth problems  Talk to your healthcare provider before you take any medicines  Many medicines may harm your baby if you take them when you are pregnant  Do not take any medicines, vitamins, herbs, or supplements without first talking to your healthcare provider  Never use illegal or street drugs (such as marijuana or cocaine) while you are pregnant  Safety tips during pregnancy:   Avoid hot tubs and saunas  Do not use a hot tub or sauna while you are pregnant, especially during your first trimester  Hot tubs and saunas may raise your baby's temperature and increase the risk of birth defects  Avoid toxoplasmosis  This is an infection caused by eating raw meat or being around infected cat feces  It can cause birth defects, miscarriages, and other problems  Wash your hands after you touch raw meat  Make sure any meat is well-cooked before you eat it  Avoid raw eggs and unpasteurized milk   Use gloves or ask someone else to clean your cat's litter box while you are pregnant  Ask your healthcare provider about travel  The most comfortable time to travel is during the second trimester  Ask your provider if you can travel after 36 weeks  You may not be able to travel in an airplane after 36 weeks  He or she may also recommend you avoid long road trips  Changes happening with your baby:  By 38 weeks, your baby may weigh between 6 and 9 pounds  Your baby may be about 14 inches long from the top of the head to the rump (baby's bottom)  Your baby hears well enough to know your voice  As your baby gets larger, you may feel fewer kicks and more stretching and rolling  Your baby may move into a head-down position  Your baby will also rest lower in your abdomen  What you need to know about prenatal care: Your healthcare provider will check your blood pressure and weight  You may also need the following:  A urine test  may also be done to check for sugar and protein  These can be signs of gestational diabetes or infection  Protein in your urine may also be a sign of preeclampsia  Preeclampsia is a condition that can develop during week 20 or later of your pregnancy  It causes high blood pressure, and it can cause problems with your kidneys and other organs  A gestational diabetes screen  may be done  Your healthcare provider may order either a 1-step or 2-step oral glucose tolerance test (OGTT)  1-step OGTT:  Your blood sugar level will be tested after you have not eaten for 8 hours (fasting)  You will then be given a glucose drink  Your level will be tested again 1 hour and 2 hours after you finish the drink  2-step OGTT:  You do not have to fast for the first part of the test  You will have the glucose drink at any time of day  Your blood sugar level will be checked 1 hour later  If your blood sugar is higher than a certain level, another test will be ordered  You will fast and your blood sugar level will be tested  You will have the glucose drink  Your blood will be tested again 1 hour, 2 hours, and 3 hours after you finish the glucose drink  A blood test  may be done to check for anemia (low iron level)  A Tdap vaccine  may be recommended by your healthcare provider  A group B strep test  is a test that is done to check for group B strep infection  Group B strep is a type of bacteria that may be found in the vagina or rectum  It can be passed to your baby during delivery if you have it  Your healthcare provider will take swab your vagina or rectum and send the sample to the lab for tests  Fundal height  is a measurement of your uterus to check your baby's growth  This number is usually the same as the number of weeks that you have been pregnant  Your healthcare provider may also check your baby's position  Your baby's heart rate  will be checked  Follow up with your obstetrician as directed:  Write down your questions so you remember to ask them during your visits  © Copyright [address] 2022 Information is for End User's use only and may not be sold, redistributed or otherwise used for commercial purposes  All illustrations and images included in CareNotes® are the copyrighted property of [employer]  or [address] [place], [place] [address]josé miguel maggie   The above information is an  only  It is not intended as medical advice for individual conditions or treatments  Talk to your doctor, nurse or pharmacist before following any medical regimen to see if it is safe and effective for you

## 2022-07-22 ENCOUNTER — ROUTINE PRENATAL (OUTPATIENT)
Dept: OBGYN CLINIC | Facility: CLINIC | Age: 27
End: 2022-07-22

## 2022-07-22 VITALS — WEIGHT: 293 LBS | DIASTOLIC BLOOD PRESSURE: 78 MMHG | SYSTOLIC BLOOD PRESSURE: 110 MMHG | BODY MASS INDEX: 55.04 KG/M2

## 2022-07-22 DIAGNOSIS — Z34.03 ENCOUNTER FOR SUPERVISION OF NORMAL FIRST PREGNANCY IN THIRD TRIMESTER: Primary | ICD-10-CM

## 2022-07-22 PROCEDURE — PNV: Performed by: NURSE PRACTITIONER

## 2022-07-22 NOTE — PROGRESS NOTES
OFFICE VISIT: Denies any N/V, HA, Cramping, VB, LOF, Edema, Domestic Violence, Smoking  + FM  Tolerating PNV  Pt aware should be getting weekly NST's/KIARA's  States she didn't have any appointments scheduled so she thought she was done  Reviewed it is recommended she continue weekly until delivery  Advised to call and schedule with MFM  Urine neg/neg  RTO 1 week or sooner as needed

## 2022-07-29 ENCOUNTER — ROUTINE PRENATAL (OUTPATIENT)
Dept: OBGYN CLINIC | Facility: CLINIC | Age: 27
End: 2022-07-29

## 2022-07-29 ENCOUNTER — HOSPITAL ENCOUNTER (OUTPATIENT)
Facility: HOSPITAL | Age: 27
Discharge: HOME | End: 2022-07-29
Attending: OBSTETRICS & GYNECOLOGY | Admitting: OBSTETRICS & GYNECOLOGY
Payer: COMMERCIAL

## 2022-07-29 VITALS
RESPIRATION RATE: 18 BRPM | DIASTOLIC BLOOD PRESSURE: 55 MMHG | WEIGHT: 293 LBS | BODY MASS INDEX: 47.09 KG/M2 | SYSTOLIC BLOOD PRESSURE: 117 MMHG | HEART RATE: 90 BPM | HEIGHT: 66 IN | TEMPERATURE: 98.2 F

## 2022-07-29 VITALS — SYSTOLIC BLOOD PRESSURE: 116 MMHG | DIASTOLIC BLOOD PRESSURE: 80 MMHG | BODY MASS INDEX: 55.85 KG/M2 | WEIGHT: 293 LBS

## 2022-07-29 DIAGNOSIS — Z34.03 ENCOUNTER FOR SUPERVISION OF NORMAL FIRST PREGNANCY IN THIRD TRIMESTER: Primary | ICD-10-CM

## 2022-07-29 PROBLEM — Z3A.39 39 WEEKS GESTATION OF PREGNANCY: Status: ACTIVE | Noted: 2022-06-03

## 2022-07-29 PROCEDURE — 99214 OFFICE O/P EST MOD 30 MIN: CPT

## 2022-07-29 PROCEDURE — PNV: Performed by: NURSE PRACTITIONER

## 2022-07-29 PROCEDURE — NC001 PR NO CHARGE: Performed by: OBSTETRICS & GYNECOLOGY

## 2022-07-29 PROCEDURE — 76815 OB US LIMITED FETUS(S): CPT | Performed by: OBSTETRICS & GYNECOLOGY

## 2022-07-29 NOTE — PROGRESS NOTES
L&D Triage Note - OB/GYN  Delvin Cervantes 32 y o  female MRN: [id number]  Unit/Bed#:  TRIAGE 5-01 Encounter: [id number]      ASSESSMENT:    Delvin Cervantes is a 32 y o  Angel Crumb at 39w6d presents for rule-out rupture of membranes due to feeling a medium sized gush of fluid at around 11 this morning  PLAN:    1) R/o rupture   NST reactive   KIARA 17 8 cm   SVE: closed  2) Continue routine prenatal care  3) Discharge from [facility] triage with term labor precautions    - Reviewed rupture of membranes, false vs true labor, decreased fetal movement, and vaginal bleeding   - Pt to call provider with any concerns and follow up at her next scheduled prenatal appointment Monday for induction   - Case discussed with Dr Jaqueline Rodriguez:    Delvin Cervantes 32 y o  [place] Crumb at 39w6d with an Estimated Date of Delivery: 7/30/22 who presented to triage for rule-out rupture  She felt a medium sized gush of fluid at 11 am and continued to have leakage throughout the day which she thought was yellow-tinted and odorless  She felt a stabbing pain which is associated with hardening of her stomach every 10-15 minutes  She endorses fetal movement, and has no bleeding  Her current obstetrical history is not otherwise significant  Her past obstetrical history is significant for 1 prior miscarriage  Contractions: Yes  Leakage of fluid: Yes  Vaginal Bleeding: No   Fetal movement: present    OBJECTIVE:    There were no vitals filed for this visit      ROS:  Constitutional: Negative  Respiratory: Negative  Cardiovascular: Negative    Gastrointestinal: Negative    General Physical Exam:  General: in no apparent distress  Cardiovascular: Cor RRR  Lungs: non-labored breathing  Abdomen: abdomen is soft without significant tenderness, masses, organomegaly or guarding  Lower extremeties: nontender    Cervical Exam  Speculum: Cervical os is closed  SVE: 0 5 / 0% / -3    Fetal monitoring:  FHT:  120 bpm/ Moderate 6 - 25 bpm / 15 x 15 accelerations present, no decelerations  [place]: contractions      KOH/WTMT:     Infection:   - No clue cells    - No hyphae   - No trichomonads present    Membrane status   - No ferning   - No nitrazene   - No pooling     Imaging:      Abd  US   KIARA      - Q1 7 9cm     - Q2 3 cm     - Q3 0 5cm     - Q4 6 4cm     - Total: 17 8 cm   Placenta: anterior   Presentation: vertex    Patient examined with Dr Yakelin Poole MD,  OBGYN PGY-1  7/29/2022 6:09 PM

## 2022-07-29 NOTE — PROGRESS NOTES
OFFICE VISIT: Denies any N/V, HA, VB, Edema, Domestic Violence, Smoking  + FM  Tolerating PNV  Some cramping, nothing regular  Pt states she had a gush of clear fluid around 11am this morning  She has continued to have ? Leakage since then  She is unsure as she normally wears a miki-pad due to discharge  She has continued to change pads throughout the day  She did not call as she knew she had this appointment today so she figured she would tell us here  Sterile speculum not available in office  Nitrizine positive  Pt advised to report to Labor and Delivery for rule out rupture  Reviewed importance of calling office with any questionable leakage of fluid  Labor precautions again reviewed  If discharge, recommend she call office on Monday to schedule for induction for post dates  RTO 1 week or sooner as needed

## 2022-07-29 NOTE — PROCEDURES
Joel Grey, a  at 39w6d with an HAILEY of 2022, by Last Menstrual Period, was seen at [address] for the following procedure(s): $Procedure Type: KIARA]         4 Quadrant KIARA  KIARA Q1 (cm): 7 9 cm  KIARA Q2 (cm): 3 cm  KIARA Q3 (cm): 0 5 cm  KIARA Q4 (cm): 6 4 cm  KIARA TOTAL (cm): 17 8 cm       Vertex  Fetal movement present, gross and fine  Fetal tone appropriate, flexion and extension movements noted

## 2022-08-01 ENCOUNTER — TELEPHONE (OUTPATIENT)
Dept: OBGYN CLINIC | Facility: CLINIC | Age: 27
End: 2022-08-01

## 2022-08-01 DIAGNOSIS — B00.9 HERPES: ICD-10-CM

## 2022-08-01 NOTE — TELEPHONE ENCOUNTER
Scheduled IOL 8/8 @ 7am   On calendar, sticky and message sent to MD  [address] message sent to patient and apt scheudled for tomorrow

## 2022-08-02 ENCOUNTER — ROUTINE PRENATAL (OUTPATIENT)
Dept: OBGYN CLINIC | Facility: CLINIC | Age: 27
End: 2022-08-02

## 2022-08-02 VITALS — DIASTOLIC BLOOD PRESSURE: 80 MMHG | SYSTOLIC BLOOD PRESSURE: 112 MMHG | BODY MASS INDEX: 55.75 KG/M2 | WEIGHT: 293 LBS

## 2022-08-02 DIAGNOSIS — Z34.03 ENCOUNTER FOR SUPERVISION OF NORMAL FIRST PREGNANCY IN THIRD TRIMESTER: Primary | ICD-10-CM

## 2022-08-02 PROCEDURE — PNV: Performed by: NURSE PRACTITIONER

## 2022-08-02 RX ORDER — VALACYCLOVIR HYDROCHLORIDE 500 MG/1
TABLET, FILM COATED ORAL
Qty: 60 TABLET | Refills: 1 | Status: SHIPPED | OUTPATIENT
Start: 2022-08-02 | End: 2022-10-01

## 2022-08-02 NOTE — PROGRESS NOTES
OFFICE VISIT: Denies any N/V, HA, Cramping, VB, LOF, Edema, Domestic Violence, Smoking  + FM  Tolerating PNV  Pt upset and uncomfortable, wants baby to come out now  Has induction scheduled for 8/8/2022  Labor precautions reviewed  RTO postpartum or sooner as needed

## 2022-08-08 ENCOUNTER — HOSPITAL ENCOUNTER (OUTPATIENT)
Dept: LABOR AND DELIVERY | Facility: HOSPITAL | Age: 27
Discharge: HOME | End: 2022-08-08
Payer: COMMERCIAL

## 2022-08-08 ENCOUNTER — HOSPITAL ENCOUNTER (INPATIENT)
Facility: HOSPITAL | Age: 27
LOS: 4 days | Discharge: HOME | End: 2022-08-12
Attending: OBSTETRICS & GYNECOLOGY | Admitting: OBSTETRICS & GYNECOLOGY
Payer: COMMERCIAL

## 2022-08-08 DIAGNOSIS — Z3A.41 41 WEEKS GESTATION OF PREGNANCY: Primary | ICD-10-CM

## 2022-08-08 DIAGNOSIS — Z98.891 STATUS POST PRIMARY LOW TRANSVERSE CESAREAN SECTION: ICD-10-CM

## 2022-08-08 DIAGNOSIS — O48.0 41 WEEKS GESTATION OF PREGNANCY: Primary | ICD-10-CM

## 2022-08-08 PROBLEM — Z34.90 ENCOUNTER FOR INDUCTION OF LABOR: Status: ACTIVE | Noted: 2022-08-08

## 2022-08-08 LAB
ABO GROUP BLD: NORMAL
ABO GROUP BLD: NORMAL
AMPHETAMINES SERPL QL SCN: NEGATIVE
BARBITURATES UR QL: NEGATIVE
BENZODIAZ UR QL: NEGATIVE
BLD GP AB SCN SERPL QL: NEGATIVE
COCAINE UR QL: NEGATIVE
ERYTHROCYTE [DISTWIDTH] IN BLOOD BY AUTOMATED COUNT: 13.6 % (ref 11.6–15.1)
HCT VFR BLD AUTO: 36.3 % (ref 34.8–46.1)
HGB BLD-MCNC: 12.5 G/DL (ref 11.5–15.4)
MCH RBC QN AUTO: 32.4 PG (ref 26.8–34.3)
MCHC RBC AUTO-ENTMCNC: 34.4 G/DL (ref 31.4–37.4)
MCV RBC AUTO: 94 FL (ref 82–98)
METHADONE UR QL: NEGATIVE
OPIATES UR QL SCN: NEGATIVE
OXYCODONE+OXYMORPHONE UR QL SCN: NEGATIVE
PCP UR QL: NEGATIVE
PLATELET # BLD AUTO: 257 THOUSANDS/UL (ref 149–390)
PMV BLD AUTO: 9.3 FL (ref 8.9–12.7)
RBC # BLD AUTO: 3.86 MILLION/UL (ref 3.81–5.12)
RH BLD: POSITIVE
RH BLD: POSITIVE
SPECIMEN EXPIRATION DATE: NORMAL
THC UR QL: NEGATIVE
WBC # BLD AUTO: 11.49 THOUSAND/UL (ref 4.31–10.16)

## 2022-08-08 PROCEDURE — NC001 PR NO CHARGE: Performed by: OBSTETRICS & GYNECOLOGY

## 2022-08-08 PROCEDURE — 4A1HXCZ MONITORING OF PRODUCTS OF CONCEPTION, CARDIAC RATE, EXTERNAL APPROACH: ICD-10-PCS | Performed by: STUDENT IN AN ORGANIZED HEALTH CARE EDUCATION/TRAINING PROGRAM

## 2022-08-08 PROCEDURE — 86592 SYPHILIS TEST NON-TREP QUAL: CPT

## 2022-08-08 PROCEDURE — 82570 ASSAY OF URINE CREATININE: CPT

## 2022-08-08 PROCEDURE — 3E033VJ INTRODUCTION OF OTHER HORMONE INTO PERIPHERAL VEIN, PERCUTANEOUS APPROACH: ICD-10-PCS | Performed by: STUDENT IN AN ORGANIZED HEALTH CARE EDUCATION/TRAINING PROGRAM

## 2022-08-08 PROCEDURE — 85027 COMPLETE CBC AUTOMATED: CPT

## 2022-08-08 PROCEDURE — 10H073Z INSERTION OF MONITORING ELECTRODE INTO PRODUCTS OF CONCEPTION, VIA NATURAL OR ARTIFICIAL OPENING: ICD-10-PCS | Performed by: STUDENT IN AN ORGANIZED HEALTH CARE EDUCATION/TRAINING PROGRAM

## 2022-08-08 PROCEDURE — 10H07YZ INSERTION OF OTHER DEVICE INTO PRODUCTS OF CONCEPTION, VIA NATURAL OR ARTIFICIAL OPENING: ICD-10-PCS | Performed by: STUDENT IN AN ORGANIZED HEALTH CARE EDUCATION/TRAINING PROGRAM

## 2022-08-08 PROCEDURE — 4A1H7CZ MONITORING OF PRODUCTS OF CONCEPTION, CARDIAC RATE, VIA NATURAL OR ARTIFICIAL OPENING: ICD-10-PCS | Performed by: STUDENT IN AN ORGANIZED HEALTH CARE EDUCATION/TRAINING PROGRAM

## 2022-08-08 PROCEDURE — 80307 DRUG TEST PRSMV CHEM ANLYZR: CPT | Performed by: OBSTETRICS & GYNECOLOGY

## 2022-08-08 PROCEDURE — 86901 BLOOD TYPING SEROLOGIC RH(D): CPT

## 2022-08-08 PROCEDURE — 84156 ASSAY OF PROTEIN URINE: CPT

## 2022-08-08 PROCEDURE — 86850 RBC ANTIBODY SCREEN: CPT

## 2022-08-08 PROCEDURE — 86900 BLOOD TYPING SEROLOGIC ABO: CPT

## 2022-08-08 RX ORDER — OXYTOCIN/RINGER'S LACTATE 30/500 ML
1-30 PLASTIC BAG, INJECTION (ML) INTRAVENOUS
Status: DISCONTINUED | OUTPATIENT
Start: 2022-08-08 | End: 2022-08-10

## 2022-08-08 RX ORDER — SODIUM CHLORIDE, SODIUM LACTATE, POTASSIUM CHLORIDE, CALCIUM CHLORIDE 600; 310; 30; 20 MG/100ML; MG/100ML; MG/100ML; MG/100ML
125 INJECTION, SOLUTION INTRAVENOUS CONTINUOUS
Status: DISCONTINUED | OUTPATIENT
Start: 2022-08-08 | End: 2022-08-10

## 2022-08-08 RX ORDER — VALACYCLOVIR HYDROCHLORIDE 500 MG/1
1000 TABLET, FILM COATED ORAL DAILY
Status: DISCONTINUED | OUTPATIENT
Start: 2022-08-08 | End: 2022-08-12 | Stop reason: HOSPADM

## 2022-08-08 RX ADMIN — VALACYCLOVIR HYDROCHLORIDE 1000 MG: 500 TABLET, FILM COATED ORAL at 11:00

## 2022-08-08 RX ADMIN — SODIUM CHLORIDE, SODIUM LACTATE, POTASSIUM CHLORIDE, AND CALCIUM CHLORIDE 125 ML/HR: .6; .31; .03; .02 INJECTION, SOLUTION INTRAVENOUS at 10:10

## 2022-08-08 RX ADMIN — Medication 2 MILLI-UNITS/MIN: at 10:09

## 2022-08-08 RX ADMIN — SODIUM CHLORIDE, SODIUM LACTATE, POTASSIUM CHLORIDE, AND CALCIUM CHLORIDE 125 ML/HR: .6; .31; .03; .02 INJECTION, SOLUTION INTRAVENOUS at 17:19

## 2022-08-08 NOTE — OB LABOR/OXYTOCIN SAFETY PROGRESS
Oxytocin Safety Progress Check Note - Abby Laird 32 y o  female MRN: [id number]    Unit/Bed#: -01 Encounter: [id number]    Dose (cristina-units/min) Oxytocin: 18 cristina-units/min  Contraction Frequency (minutes): 1 5-3  Contraction Quality: Mild  Tachysystole: No   Cervical Dilation: 3        Cervical Effacement: 60  Fetal Station: -3  Baseline Rate: 120 bpm  Fetal Heart Rate: 122 BPM  FHR Category: Category I      Vital Signs:   Vitals:    08/08/22 1332   BP: 127/59   Pulse: 81   Resp:    Temp:    SpO2:        Notes/comments:   Patient feeling some mild cramping, continue to monitor and manage and titrate oxytocin as needed      Rahat Dixon MD 8/8/2022 2:06 PM

## 2022-08-08 NOTE — OB LABOR/OXYTOCIN SAFETY PROGRESS
Labor Progress Note - Osmany Gonzales 32 y o  female MRN: [id number]    Unit/Bed#: LD TRIAGE 4-01 Encounter: [id number]       Contraction Frequency (minutes): irregular         Cervical Dilation: 3        Cervical Effacement: 60  Fetal Station: -3  Baseline Rate: 110 bpm     FHR Category: Category I               Vital Signs: There were no vitals filed for this visit  Notes/comments:   Hunt balloon attempted, unsuccessful, but SVE now favorable  Will start pitocin titration  FHT category I       Jennifer Steele MD 8/8/2022 9:47 AM

## 2022-08-08 NOTE — PLAN OF CARE
Problem: BIRTH - VAGINAL/ SECTION  Goal: Fetal and maternal status remain reassuring during the birth process  Description: INTERVENTIONS:  - Monitor vital signs  - Monitor fetal heart rate  - Monitor uterine activity  - Monitor labor progression (vaginal delivery)  - DVT prophylaxis  - Antibiotic prophylaxis  Outcome: Progressing  Goal: Emotionally satisfying birthing experience for mother/fetus  Description: Interventions:  - Assess, plan, implement and evaluate the nursing care given to the patient in labor  - Advocate the philosophy that each childbirth experience is a unique experience and support the family's chosen level of involvement and control during the labor process   - Actively participate in both the patient's and family's teaching of the birth process  - Consider cultural, [place] and age-specific factors and plan care for the patient in labor  Outcome: Progressing     Problem: Knowledge Deficit  Goal: Verbalizes understanding of labor plan  Description: Assess patient/family/caregiver's baseline knowledge level and ability to understand information  Provide education via patient/family/caregiver's preferred learning method at appropriate level of understanding  1  Provide teaching at level of understanding  2  Provide teaching via preferred learning method(s)  Outcome: Progressing  Goal: Patient/family/caregiver demonstrates understanding of disease process, treatment plan, medications, and discharge instructions  Description: Complete learning assessment and assess knowledge base  Interventions:  - Provide teaching at level of understanding  - Provide teaching via preferred learning methods  Outcome: Progressing     Problem: Labor & Delivery  Goal: Manages discomfort  Description: Assess and monitor for signs and symptoms of discomfort  Assess patient's pain level regularly and per hospital policy  Administer medications as ordered   Support use of nonpharmacological methods to help control pain such as distraction, imagery, relaxation, and application of heat and cold  Collaborate with interdisciplinary team and patient to determine appropriate pain management plan  1  Include patient in decisions related to comfort  2  Offer non-pharmacological pain management interventions  3  Report ineffective pain management to physician  Outcome: Progressing  Goal: Patient vital signs are stable  Description: 1  Assess vital signs - vaginal delivery    Outcome: Progressing     Problem: PAIN - ADULT  Goal: Verbalizes/displays adequate comfort level or baseline comfort level  Description: Interventions:  - Encourage patient to monitor pain and request assistance  - Assess pain using appropriate pain scale  - Administer analgesics based on type and severity of pain and evaluate response  - Implement non-pharmacological measures as appropriate and evaluate response  - Consider cultural and social influences on pain and pain management  - Notify physician/advanced practitioner if interventions unsuccessful or patient reports new pain  Outcome: Progressing     Problem: INFECTION - ADULT  Goal: Absence or prevention of progression during hospitalization  Description: INTERVENTIONS:  - Assess and monitor for signs and symptoms of infection  - Monitor lab/diagnostic results  - Monitor all insertion sites, i e  indwelling lines, tubes, and drains  - Monitor endotracheal if appropriate and nasal secretions for changes in amount and color  - [place] appropriate cooling/warming therapies per order  - Administer medications as ordered  - Instruct and encourage patient and family to use good hand hygiene technique  - Identify and instruct in appropriate isolation precautions for identified infection/condition  Outcome: Progressing  Goal: Absence of fever/infection during neutropenic period  Description: INTERVENTIONS:  - Monitor WBC    Outcome: Progressing     Problem: SAFETY ADULT  Goal: Patient will remain free of falls  Description: INTERVENTIONS:  - Educate patient/family on patient safety including physical limitations  - Instruct patient to call for assistance with activity   - Consult OT/PT to assist with strengthening/mobility   - Keep Call bell within reach  - Keep bed low and locked with side rails adjusted as appropriate  - Keep care items and personal belongings within reach  - Initiate and maintain comfort rounds  - Make Fall Risk Sign visible to staff  - Apply yellow socks and bracelet for high fall risk patients  - Consider moving patient to room near nurses station  Outcome: Progressing  Goal: Maintain or return to baseline ADL function  Description: INTERVENTIONS:  -  Assess patient's ability to carry out ADLs; assess patient's baseline for ADL function and identify physical deficits which impact ability to perform ADLs (bathing, care of mouth/teeth, toileting, grooming, dressing, etc )  - Assess/evaluate cause of self-care deficits   - Assess range of motion  - Assess patient's mobility; develop plan if impaired  - Assess patient's need for assistive devices and provide as appropriate  - Encourage maximum independence but intervene and supervise when necessary  - Involve family in performance of ADLs  - Assess for home care needs following discharge   - Consider OT consult to assist with ADL evaluation and planning for discharge  - Provide patient education as appropriate  Outcome: Progressing  Goal: Maintains/Returns to pre admission functional level  Description: INTERVENTIONS:  - Perform BMAT or MOVE assessment daily    - Set and communicate daily mobility goal to care team and patient/family/caregiver     - Collaborate with rehabilitation services on mobility goals if consulted  - Out of bed for toileting  - Record patient progress and toleration of activity level   Outcome: Progressing     Problem: DISCHARGE PLANNING  Goal: Discharge to home or other facility with appropriate resources  Description: INTERVENTIONS:  - Identify barriers to discharge w/patient and caregiver  - Arrange for needed discharge resources and transportation as appropriate  - Identify discharge learning needs (meds, wound care, etc )  - Arrange for interpretive services to assist at discharge as needed  - Refer to Case Management Department for coordinating discharge planning if the patient needs post-hospital services based on physician/advanced practitioner order or complex needs related to functional status, cognitive ability, or social support system  Outcome: Progressing

## 2022-08-08 NOTE — OB LABOR/OXYTOCIN SAFETY PROGRESS
Oxytocin Safety Progress Check Note - Emma Wilson 32 y o  female MRN: [id number]    Unit/Bed#: -01 Encounter: [id number]    Dose (cristina-units/min) Oxytocin: 0 cristina-units/min (pitocin break)  Contraction Frequency (minutes): 1-2 5  Contraction Quality: Mild  Tachysystole: No   Baseline Rate: 115 bpm  Fetal Heart Rate: 111 BPM  FHR Category: Category I        Vital Signs:   Vitals:    08/08/22 1724   BP: 137/63   Pulse: 80   Resp:    Temp:    SpO2:        Notes/comments:   Reviewed with patient recommendation for oxytocin break to improve response  Allow dinner and shower if desired, walk around  Patient aware of options of elective c/s as well as recommendation for vaginal delivery if able      Wiliam Calvo MD 8/8/2022 5:51 PM

## 2022-08-08 NOTE — PLAN OF CARE
Problem: BIRTH - VAGINAL/ SECTION  Goal: Fetal and maternal status remain reassuring during the birth process  Description: INTERVENTIONS:  - Monitor vital signs  - Monitor fetal heart rate  - Monitor uterine activity  - Monitor labor progression (vaginal delivery)  - DVT prophylaxis  - Antibiotic prophylaxis  Outcome: Progressing  Goal: Emotionally satisfying birthing experience for mother/fetus  Description: Interventions:  - Assess, plan, implement and evaluate the nursing care given to the patient in labor  - Advocate the philosophy that each childbirth experience is a unique experience and support the family's chosen level of involvement and control during the labor process   - Actively participate in both the patient's and family's teaching of the birth process  - Consider cultural, Adventism and age-specific factors and plan care for the patient in labor  Outcome: Progressing     Problem: Knowledge Deficit  Goal: Verbalizes understanding of labor plan  Description: Assess patient/family/caregiver's baseline knowledge level and ability to understand information  Provide education via patient/family/caregiver's preferred learning method at appropriate level of understanding  1  Provide teaching at level of understanding  2  Provide teaching via preferred learning method(s)  Outcome: Progressing  Goal: Patient/family/caregiver demonstrates understanding of disease process, treatment plan, medications, and discharge instructions  Description: Complete learning assessment and assess knowledge base  Interventions:  - Provide teaching at level of understanding  - Provide teaching via preferred learning methods  Outcome: Progressing     Problem: Labor & Delivery  Goal: Manages discomfort  Description: Assess and monitor for signs and symptoms of discomfort  Assess patient's pain level regularly and per hospital policy  Administer medications as ordered   Support use of nonpharmacological methods to help control pain such as distraction, imagery, relaxation, and application of heat and cold  Collaborate with interdisciplinary team and patient to determine appropriate pain management plan  1  Include patient in decisions related to comfort  2  Offer non-pharmacological pain management interventions  3  Report ineffective pain management to physician  Outcome: Progressing  Goal: Patient vital signs are stable  Description: 1  Assess vital signs - vaginal delivery    Outcome: Progressing     Problem: PAIN - ADULT  Goal: Verbalizes/displays adequate comfort level or baseline comfort level  Description: Interventions:  - Encourage patient to monitor pain and request assistance  - Assess pain using appropriate pain scale  - Administer analgesics based on type and severity of pain and evaluate response  - Implement non-pharmacological measures as appropriate and evaluate response  - Consider cultural and social influences on pain and pain management  - Notify physician/advanced practitioner if interventions unsuccessful or patient reports new pain  Outcome: Progressing     Problem: INFECTION - ADULT  Goal: Absence or prevention of progression during hospitalization  Description: INTERVENTIONS:  - Assess and monitor for signs and symptoms of infection  - Monitor lab/diagnostic results  - Monitor all insertion sites, i e  indwelling lines, tubes, and drains  - Monitor endotracheal if appropriate and nasal secretions for changes in amount and color  - [place] appropriate cooling/warming therapies per order  - Administer medications as ordered  - Instruct and encourage patient and family to use good hand hygiene technique  - Identify and instruct in appropriate isolation precautions for identified infection/condition  Outcome: Progressing  Goal: Absence of fever/infection during neutropenic period  Description: INTERVENTIONS:  - Monitor WBC    Outcome: Progressing     Problem: SAFETY ADULT  Goal: Patient will remain free of falls  Description: INTERVENTIONS:  - Educate patient/family on patient safety including physical limitations  - Instruct patient to call for assistance with activity   - Consult OT/PT to assist with strengthening/mobility   - Keep Call bell within reach  - Keep bed low and locked with side rails adjusted as appropriate  - Keep care items and personal belongings within reach  - Initiate and maintain comfort rounds  - Make Fall Risk Sign visible to staff  - Offer Toileting every  Hours, in advance of need  - Initiate/Maintain alarm  - Obtain necessary fall risk management equipment:   - Apply yellow socks and bracelet for high fall risk patients  - Consider moving patient to room near nurses station  Outcome: Progressing  Goal: Maintain or return to baseline ADL function  Description: INTERVENTIONS:  -  Assess patient's ability to carry out ADLs; assess patient's baseline for ADL function and identify physical deficits which impact ability to perform ADLs (bathing, care of mouth/teeth, toileting, grooming, dressing, etc )  - Assess/evaluate cause of self-care deficits   - Assess range of motion  - Assess patient's mobility; develop plan if impaired  - Assess patient's need for assistive devices and provide as appropriate  - Encourage maximum independence but intervene and supervise when necessary  - Involve family in performance of ADLs  - Assess for home care needs following discharge   - Consider OT consult to assist with ADL evaluation and planning for discharge  - Provide patient education as appropriate  Outcome: Progressing  Goal: Maintains/Returns to pre admission functional level  Description: INTERVENTIONS:  - Perform BMAT or MOVE assessment daily    - Set and communicate daily mobility goal to care team and patient/family/caregiver  - Collaborate with rehabilitation services on mobility goals if consulted  - Perform Range of Motion  times a day  - Reposition patient every  hours    - Dangle patient  times a day  - Stand patient  times a day  - Ambulate patient  times a day  - Out of bed to chair  times a day   - Out of bed for meals times a day  - Out of bed for toileting  - Record patient progress and toleration of activity level   Outcome: Progressing     Problem: DISCHARGE PLANNING  Goal: Discharge to home or other facility with appropriate resources  Description: INTERVENTIONS:  - Identify barriers to discharge w/patient and caregiver  - Arrange for needed discharge resources and transportation as appropriate  - Identify discharge learning needs (meds, wound care, etc )  - Arrange for interpretive services to assist at discharge as needed  - Refer to Case Management Department for coordinating discharge planning if the patient needs post-hospital services based on physician/advanced practitioner order or complex needs related to functional status, cognitive ability, or social support system  Outcome: Progressing

## 2022-08-08 NOTE — OB LABOR/OXYTOCIN SAFETY PROGRESS
Oxytocin Safety Progress Check Note - Cristian Csota 32 y o  female MRN: [id number]    Unit/Bed#: -01 Encounter: [id number]    Dose (cristina-units/min) Oxytocin: 14 cristina-units/min  Contraction Frequency (minutes): 1 5-3  Contraction Quality: Mild  Tachysystole: No   Cervical Dilation: 3        Cervical Effacement: 60  Fetal Station: -3  Baseline Rate: 120 bpm  Fetal Heart Rate: 122 BPM  FHR Category: Category I               Vital Signs:   Vitals:    08/08/22 1200   BP: 122/69   Pulse: 83   Resp: 18   Temp:    SpO2:        Notes/comments: Cervical exam deferred at this time  Fetal heart tracing is category I  Continue Pitocin  Will discuss with Dr Tra Felipe MD 8/8/2022 1:04 PM

## 2022-08-08 NOTE — H&P
88 [facility] 32 y o  female MRN: [id number]  Unit/Bed#: -01 Encounter: [id number]    Assessment: 32 y o   at 41w2d admitted for induction of labor for postdates  SVE: 360/-3  FHT: FHT:  Baseline of 130/moderate variability/15 x 15 accels present/no decelerations  Category 1 tracing  Clinical EFW: 91st percentile ; Cephalic confirmed by Ultrasound  GBS status: negative       Plan:   Excessive weight gain during pregnancy in third trimester  Assessment & Plan  Will  patient on increased risk of shoulder dystocia  Genital herpes  Assessment & Plan  Continue suppressive therapy  Valtrex 1000mg daily  * 41 weeks gestation of pregnancy  Assessment & Plan    1  Admit to L&D  2  CBC, RPR, T&S  3  GBS status negative, will not need antibiotics  4  Analgesia and or epidural per patient preference  5  Pitocin  6  EFW  7  Anticipate   8  Clear Liquid Diet  9  IV Fluids (LR @125cc/hr)    Discussed with Dr Mckenna Last      Discussed case and plan w/ Dr Mckenna Last      Chief Complaint:  Induction of labor for postdate    HPI: Jayy Cortez is a 32 y o  Jerri Keith with an HAILEY of 2022, by Last Menstrual Period at 41w2d who is being admitted for induction of labor  She denies having uterine contractions, has no LOF, and reports no VB  She states she has felt good FM  Patient Active Problem List   Diagnosis    Genital herpes    Anxiety and depression    Morbid obesity ([address] )    41 weeks gestation of pregnancy    Obesity during pregnancy in third trimester    BMI 50 0-59 9, adult ([address] )    Excessive weight gain during pregnancy in third trimester    ADHD    Encounter for induction of labor       Baby complications/comments: none    Review of Systems   Constitutional: Negative for chills and fever  Respiratory: Negative for cough, shortness of breath and wheezing  Cardiovascular: Negative for chest pain and leg swelling     Gastrointestinal: Negative for abdominal pain, diarrhea, nausea and vomiting  Genitourinary: Negative for pelvic pain, vaginal bleeding and vaginal discharge  Musculoskeletal: Negative for back pain  Neurological: Negative for weakness, light-headedness and headaches  OB Hx:  OB History    Para Term  AB Living   2       1     SAB IAB Ectopic Multiple Live Births     1            # Outcome Date GA Lbr Yosi/2nd Weight Sex Delivery Anes PTL Lv   2 Current            1 IAB                Past Medical Hx:  Past Medical History:   Diagnosis Date    ADHD (attention deficit hyperactivity disorder)     Anxiety     Asthma     [address] [facility]chronic obstructive pulmonary disease) [address] [address]     Depression     Pneumonia     Urinary tract infection        Past Surgical hx:  Past Surgical History:   Procedure Laterality Date    WISDOM TOOTH EXTRACTION         Social Hx:  Alcohol use:  Denies  Tobacco use:  Denies  Other substance use:  Denies        Allergies   Allergen Reactions    Lorabid [Loracarbef] Other (See Comments)     Unknown since childhood       Medications Prior to Admission   Medication    Cholecalciferol 25 MCG (1000 UT) capsule    Magnesium-Potassium (MAGNESIUM FIZZ-PLUS PO)    Prenatal Vit-Fe Fumarate-FA (PRENATAL VITAMIN PO)    valACYclovir (VALTREX) 500 mg tablet       Objective:  Temp:  [97 8 °F (36 6 °C)] 97 8 °F (36 6 °C)  HR:  [] 81  Resp:  [18] 18  BP: (117-130)/(55-69) 127/59  Body mass index is 55 68 kg/m²  Physical Exam:  Physical Exam  Constitutional:       Appearance: Normal appearance  Cardiovascular:      Rate and Rhythm: Normal rate and regular rhythm  Pulmonary:      Effort: Pulmonary effort is normal  No respiratory distress  Abdominal:      Palpations: Abdomen is soft  Tenderness: There is no abdominal tenderness  Musculoskeletal:         General: No swelling or tenderness  Neurological:      General: No focal deficit present        Mental Status: She is alert and oriented to person, place, and time  Skin:     General: Skin is warm and dry  Vitals reviewed              FHT:  Baseline Rate: 120 bpm  Variability: Moderate 6-25 bpm  Accelerations: 15 x 15 or greater  Decelerations: None  FHR Category: Category I    TOCO:   Contraction Frequency (minutes): 1 5-3  Contraction Duration (seconds): 40-70  Contraction Quality: Mild    Lab Results   Component Value Date    WBC 11 49 (H) 08/08/2022    HGB 12 5 08/08/2022    HCT 36 3 08/08/2022     08/08/2022     No results found for: NA, K, CL, CO2, BUN, CREATININE, GLUCOSE, AST, ALT  Prenatal Labs: Reviewed      Blood type:  A positive  Antibody:  Negative  GBS:  Negative  HIV:  Nonreactive  Rubella: Immune  VDRL/RPR: Non reactive  HBsAg: Negative  Chlamydia: Negative  Gonorrhea: Negative  Diabetes 1 hour screen:  85  3 hour glucose:  Not indicated  Platelets:  705  Hgb:  12 5  >2 Midnights  INPATIENT     Signature/Title: Farzana Desai MD  Date: 8/8/2022  Time: 2:13 PM

## 2022-08-08 NOTE — OB LABOR/OXYTOCIN SAFETY PROGRESS
Oxytocin Safety Progress Check Note - Ricarda Leahy 32 y o  female MRN: [id number]    Unit/Bed#: -01 Encounter: [id number]    Dose (cristina-units/min) Oxytocin: 28 cristina-units/min  Contraction Frequency (minutes): 1-2 5  Contraction Quality: Mild  Tachysystole: No   Cervical Dilation: 3        Cervical Effacement: 60  Fetal Station: -3  Baseline Rate: 115 bpm  Fetal Heart Rate: 100 BPM  FHR Category: Category I               Vital Signs:   Vitals:    08/08/22 1620   BP: 120/56   Pulse: 84   Resp: 20   Temp: 97 8 °F (36 6 °C)   SpO2:        Notes/comments: No interval cervical change  Dicussed risk of shoulder dystocia with patient since 4 weeks ago the fetus measured 7lbs 11oz (91%)  Risk of permanent neurological injury discussed  Discussed with patient that this cannot always be predicted and that estimate of fetal weight is not 100% accurate with ultrasound  Continue Pitocin, might need a break since currently the titration is at 28milliU/min and fetal head is too high for AROM  To be discussed with Dr Flores Laura MD 8/8/2022 4:54 PM

## 2022-08-09 ENCOUNTER — ANESTHESIA EVENT (INPATIENT)
Dept: LABOR AND DELIVERY | Facility: HOSPITAL | Age: 27
End: 2022-08-09
Payer: COMMERCIAL

## 2022-08-09 ENCOUNTER — ANESTHESIA (INPATIENT)
Dept: LABOR AND DELIVERY | Facility: HOSPITAL | Age: 27
End: 2022-08-09
Payer: COMMERCIAL

## 2022-08-09 LAB
BASE EXCESS BLDCOV CALC-SCNC: -4.6 MMOL/L (ref 1–9)
CREAT UR-MCNC: 48 MG/DL
HCO3 BLDCOV-SCNC: 20.2 MMOL/L (ref 12.2–28.6)
OXYHGB MFR BLDCOV: 82.2 %
PCO2 BLDCOV: 36.9 MM HG (ref 27–43)
PH BLDCOV: 7.36 [PH] (ref 7.19–7.49)
PO2 BLDCOV: 42.3 MM HG (ref 15–45)
PROT UR-MCNC: 10 MG/DL
PROT/CREAT UR: 0.21 MG/G{CREAT} (ref 0–0.1)
RPR SER QL: NORMAL
SAO2 % BLDCOV: 17.8 ML/DL

## 2022-08-09 PROCEDURE — 82805 BLOOD GASES W/O2 SATURATION: CPT | Performed by: STUDENT IN AN ORGANIZED HEALTH CARE EDUCATION/TRAINING PROGRAM

## 2022-08-09 RX ORDER — SODIUM CHLORIDE, SODIUM LACTATE, POTASSIUM CHLORIDE, CALCIUM CHLORIDE 600; 310; 30; 20 MG/100ML; MG/100ML; MG/100ML; MG/100ML
INJECTION, SOLUTION INTRAVENOUS CONTINUOUS PRN
Status: DISCONTINUED | OUTPATIENT
Start: 2022-08-09 | End: 2022-08-10

## 2022-08-09 RX ORDER — FAMOTIDINE 10 MG/ML
20 INJECTION, SOLUTION INTRAVENOUS ONCE
Status: COMPLETED | OUTPATIENT
Start: 2022-08-09 | End: 2022-08-09

## 2022-08-09 RX ORDER — BUPIVACAINE HYDROCHLORIDE 2.5 MG/ML
INJECTION, SOLUTION EPIDURAL; INFILTRATION; INTRACAUDAL AS NEEDED
Status: DISCONTINUED | OUTPATIENT
Start: 2022-08-09 | End: 2022-08-10

## 2022-08-09 RX ORDER — TRANEXAMIC ACID 100 MG/ML
INJECTION, SOLUTION INTRAVENOUS AS NEEDED
Status: DISCONTINUED | OUTPATIENT
Start: 2022-08-09 | End: 2022-08-10

## 2022-08-09 RX ORDER — CLINDAMYCIN PHOSPHATE 900 MG/50ML
900 INJECTION INTRAVENOUS ONCE
Status: COMPLETED | OUTPATIENT
Start: 2022-08-09 | End: 2022-08-09

## 2022-08-09 RX ORDER — TRISODIUM CITRATE DIHYDRATE AND CITRIC ACID MONOHYDRATE 500; 334 MG/5ML; MG/5ML
30 SOLUTION ORAL ONCE
Status: COMPLETED | OUTPATIENT
Start: 2022-08-09 | End: 2022-08-09

## 2022-08-09 RX ORDER — LIDOCAINE HYDROCHLORIDE AND EPINEPHRINE 15; 5 MG/ML; UG/ML
INJECTION, SOLUTION EPIDURAL AS NEEDED
Status: DISCONTINUED | OUTPATIENT
Start: 2022-08-09 | End: 2022-08-10

## 2022-08-09 RX ORDER — LIDOCAINE HYDROCHLORIDE 10 MG/ML
INJECTION, SOLUTION EPIDURAL; INFILTRATION; INTRACAUDAL; PERINEURAL AS NEEDED
Status: DISCONTINUED | OUTPATIENT
Start: 2022-08-09 | End: 2022-08-10

## 2022-08-09 RX ORDER — LABETALOL HYDROCHLORIDE 5 MG/ML
20 INJECTION, SOLUTION INTRAVENOUS ONCE
Status: COMPLETED | OUTPATIENT
Start: 2022-08-09 | End: 2022-08-09

## 2022-08-09 RX ORDER — LIDOCAINE HYDROCHLORIDE AND EPINEPHRINE 20; 5 MG/ML; UG/ML
INJECTION, SOLUTION EPIDURAL; INFILTRATION; INTRACAUDAL; PERINEURAL AS NEEDED
Status: DISCONTINUED | OUTPATIENT
Start: 2022-08-09 | End: 2022-08-10

## 2022-08-09 RX ORDER — DEXAMETHASONE SODIUM PHOSPHATE 10 MG/ML
INJECTION, SOLUTION INTRAMUSCULAR; INTRAVENOUS AS NEEDED
Status: DISCONTINUED | OUTPATIENT
Start: 2022-08-09 | End: 2022-08-10

## 2022-08-09 RX ORDER — METOCLOPRAMIDE HYDROCHLORIDE 5 MG/ML
5 INJECTION INTRAMUSCULAR; INTRAVENOUS ONCE
Status: COMPLETED | OUTPATIENT
Start: 2022-08-09 | End: 2022-08-09

## 2022-08-09 RX ORDER — ONDANSETRON 2 MG/ML
INJECTION INTRAMUSCULAR; INTRAVENOUS AS NEEDED
Status: DISCONTINUED | OUTPATIENT
Start: 2022-08-09 | End: 2022-08-10

## 2022-08-09 RX ORDER — DEXMEDETOMIDINE HYDROCHLORIDE 100 UG/ML
INJECTION, SOLUTION INTRAVENOUS AS NEEDED
Status: DISCONTINUED | OUTPATIENT
Start: 2022-08-09 | End: 2022-08-10

## 2022-08-09 RX ORDER — MORPHINE SULFATE 1 MG/ML
INJECTION, SOLUTION EPIDURAL; INTRATHECAL; INTRAVENOUS AS NEEDED
Status: DISCONTINUED | OUTPATIENT
Start: 2022-08-09 | End: 2022-08-10

## 2022-08-09 RX ORDER — OXYTOCIN/RINGER'S LACTATE 30/500 ML
PLASTIC BAG, INJECTION (ML) INTRAVENOUS CONTINUOUS PRN
Status: DISCONTINUED | OUTPATIENT
Start: 2022-08-09 | End: 2022-08-10

## 2022-08-09 RX ORDER — ROPIVACAINE HYDROCHLORIDE 2 MG/ML
INJECTION, SOLUTION EPIDURAL; INFILTRATION; PERINEURAL AS NEEDED
Status: DISCONTINUED | OUTPATIENT
Start: 2022-08-09 | End: 2022-08-10

## 2022-08-09 RX ADMIN — LIDOCAINE HYDROCHLORIDE 3 ML: 10 INJECTION, SOLUTION EPIDURAL; INFILTRATION; INTRACAUDAL at 08:17

## 2022-08-09 RX ADMIN — LIDOCAINE HYDROCHLORIDE AND EPINEPHRINE 3 ML: 15; 5 INJECTION, SOLUTION EPIDURAL at 08:36

## 2022-08-09 RX ADMIN — SODIUM CHLORIDE, SODIUM LACTATE, POTASSIUM CHLORIDE, AND CALCIUM CHLORIDE: .6; .31; .03; .02 INJECTION, SOLUTION INTRAVENOUS at 23:27

## 2022-08-09 RX ADMIN — LIDOCAINE HYDROCHLORIDE AND EPINEPHRINE 2 ML: 15; 5 INJECTION, SOLUTION EPIDURAL at 08:38

## 2022-08-09 RX ADMIN — ONDANSETRON 4 MG: 2 INJECTION INTRAMUSCULAR; INTRAVENOUS at 23:33

## 2022-08-09 RX ADMIN — VALACYCLOVIR HYDROCHLORIDE 1000 MG: 500 TABLET, FILM COATED ORAL at 10:05

## 2022-08-09 RX ADMIN — SODIUM CITRATE AND CITRIC ACID MONOHYDRATE 30 ML: 500; 334 SOLUTION ORAL at 23:09

## 2022-08-09 RX ADMIN — SODIUM CHLORIDE, SODIUM LACTATE, POTASSIUM CHLORIDE, AND CALCIUM CHLORIDE 125 ML/HR: .6; .31; .03; .02 INJECTION, SOLUTION INTRAVENOUS at 04:10

## 2022-08-09 RX ADMIN — DEXAMETHASONE SODIUM PHOSPHATE 10 MG: 10 INJECTION, SOLUTION INTRAMUSCULAR; INTRAVENOUS at 23:33

## 2022-08-09 RX ADMIN — MORPHINE SULFATE 3 MG: 1 INJECTION, SOLUTION EPIDURAL; INTRATHECAL; INTRAVENOUS at 23:56

## 2022-08-09 RX ADMIN — LIDOCAINE HYDROCHLORIDE AND EPINEPHRINE 5 ML: 20; 5 INJECTION, SOLUTION EPIDURAL; INFILTRATION; INTRACAUDAL; PERINEURAL at 23:18

## 2022-08-09 RX ADMIN — BUPIVACAINE HYDROCHLORIDE 5 ML: 2.5 INJECTION, SOLUTION EPIDURAL; INFILTRATION; INTRACAUDAL at 11:35

## 2022-08-09 RX ADMIN — Medication 2 MILLI-UNITS/MIN: at 16:12

## 2022-08-09 RX ADMIN — DEXMEDETOMIDINE HYDROCHLORIDE 20 MCG: 100 INJECTION, SOLUTION INTRAVENOUS at 23:57

## 2022-08-09 RX ADMIN — ROPIVACAINE HYDROCHLORIDE: 2 INJECTION, SOLUTION EPIDURAL; INFILTRATION at 13:52

## 2022-08-09 RX ADMIN — SODIUM CHLORIDE, SODIUM LACTATE, POTASSIUM CHLORIDE, AND CALCIUM CHLORIDE 125 ML/HR: .6; .31; .03; .02 INJECTION, SOLUTION INTRAVENOUS at 15:55

## 2022-08-09 RX ADMIN — FAMOTIDINE 20 MG: 10 INJECTION, SOLUTION INTRAVENOUS at 23:07

## 2022-08-09 RX ADMIN — BUPIVACAINE HYDROCHLORIDE 5 ML: 2.5 INJECTION, SOLUTION EPIDURAL; INFILTRATION; INTRACAUDAL at 11:33

## 2022-08-09 RX ADMIN — ROPIVACAINE HYDROCHLORIDE: 2 INJECTION, SOLUTION EPIDURAL; INFILTRATION at 09:03

## 2022-08-09 RX ADMIN — GENTAMICIN SULFATE 300 MG: 40 INJECTION, SOLUTION INTRAMUSCULAR; INTRAVENOUS at 23:33

## 2022-08-09 RX ADMIN — TRANEXAMIC ACID 1000 MG: 1 INJECTION, SOLUTION INTRAVENOUS at 23:50

## 2022-08-09 RX ADMIN — LABETALOL HYDROCHLORIDE 20 MG: 5 INJECTION, SOLUTION INTRAVENOUS at 19:24

## 2022-08-09 RX ADMIN — METOCLOPRAMIDE 5 MG: 5 INJECTION, SOLUTION INTRAMUSCULAR; INTRAVENOUS at 23:04

## 2022-08-09 RX ADMIN — SODIUM CHLORIDE, SODIUM LACTATE, POTASSIUM CHLORIDE, AND CALCIUM CHLORIDE 125 ML/HR: .6; .31; .03; .02 INJECTION, SOLUTION INTRAVENOUS at 11:25

## 2022-08-09 RX ADMIN — LIDOCAINE HYDROCHLORIDE 2 ML: 10 INJECTION, SOLUTION EPIDURAL; INFILTRATION; INTRACAUDAL at 08:25

## 2022-08-09 RX ADMIN — LIDOCAINE HYDROCHLORIDE AND EPINEPHRINE 5 ML: 20; 5 INJECTION, SOLUTION EPIDURAL; INFILTRATION; INTRACAUDAL; PERINEURAL at 23:16

## 2022-08-09 RX ADMIN — Medication 300 MILLI-UNITS/MIN: at 23:50

## 2022-08-09 RX ADMIN — AZITHROMYCIN MONOHYDRATE 500 MG: 500 INJECTION, POWDER, LYOPHILIZED, FOR SOLUTION INTRAVENOUS at 23:33

## 2022-08-09 RX ADMIN — CLINDAMYCIN PHOSPHATE 900 MG: 900 INJECTION, SOLUTION INTRAVENOUS at 23:29

## 2022-08-09 RX ADMIN — LIDOCAINE HYDROCHLORIDE AND EPINEPHRINE 3 ML: 15; 5 INJECTION, SOLUTION EPIDURAL at 08:19

## 2022-08-09 RX ADMIN — ROPIVACAINE HYDROCHLORIDE 5 ML: 2 INJECTION, SOLUTION EPIDURAL; INFILTRATION at 15:47

## 2022-08-09 RX ADMIN — ROPIVACAINE HYDROCHLORIDE: 2 INJECTION, SOLUTION EPIDURAL; INFILTRATION at 18:27

## 2022-08-09 RX ADMIN — LIDOCAINE HYDROCHLORIDE AND EPINEPHRINE 5 ML: 20; 5 INJECTION, SOLUTION EPIDURAL; INFILTRATION; INTRACAUDAL; PERINEURAL at 23:20

## 2022-08-09 RX ADMIN — LIDOCAINE HYDROCHLORIDE AND EPINEPHRINE 5 ML: 20; 5 INJECTION, SOLUTION EPIDURAL; INFILTRATION; INTRACAUDAL; PERINEURAL at 23:22

## 2022-08-09 NOTE — ANESTHESIA PROCEDURE NOTES
Epidural Block    Patient location during procedure: OB  Start time: 8/9/2022 8:35 AM  Reason for block: procedure for pain and at surgeon's request  Staffing  Performed: Anesthesiologist   Anesthesiologist: Fredda Mohs, MD  Preanesthetic Checklist  Completed: patient identified, IV checked, site marked, risks and benefits discussed, surgical consent, monitors and equipment checked, pre-op evaluation and timeout performed  Epidural  Patient position: sitting  Prep: ChloraPrep  Patient monitoring: frequent blood pressure checks, continuous pulse ox and heart rate  Approach: midline  Location: lumbar  Injection technique: YVONNE air and YVONNE saline  Needle  Needle type: Tuohy   Needle gauge: 18 G  Catheter type: side hole  Catheter size: 20 G  Catheter at skin depth: 17 cm  Test dose: negative  Assessment  Number of attempts: 3 or more  Events: blood aspiratednegative aspiration for CSF, negative aspiration for heme and no paresthesia on injection  patient tolerated the procedure well with no immediate complications  Additional Notes  Initial successful YVONNE with catheter placement followed + positive test dose, initially negative aspriation for heme  But patient had tachycardia  Aspirated blood from catheter second time, therefore catheter removed  Two more attempts to get epidural in place  Morbidly obese patient, difficult placement, hit os several times  Eventually with successful placement and negative test dose

## 2022-08-09 NOTE — OB LABOR/OXYTOCIN SAFETY PROGRESS
Oxytocin Safety Progress Check Note - Justina Sharpe 32 y o  female MRN: [id number]    Unit/Bed#: -01 Encounter: [id number]    Dose (cristina-units/min) Oxytocin: 8 cristina-units/min  Contraction Frequency (minutes): 2 5-3  Contraction Quality: Moderate  Tachysystole: No   Cervical Dilation: Lip/rim (Comment)        Cervical Effacement: 90  Fetal Station: 0  Baseline Rate: 120 bpm  Fetal Heart Rate: 115 BPM  FHR Category: Category II  Oxytocin Safety Progress Check: Safety check completed            Vital Signs:   Vitals:    08/09/22 1827   BP: 140/76   Pulse: 100   Resp:    Temp:    SpO2:        Notes/comments: SVE 9 5/90/0 with posterior lip on left side  FHT Cat I with intermittent late decelerations, receiving IV fluids  [place] contractions q 2  Discussed with attending          Jen Lopez MD 8/9/2022 6:41 PM

## 2022-08-09 NOTE — OB LABOR/OXYTOCIN SAFETY PROGRESS
Labor Progress Note - Monique Gracia 32 y o  female MRN: [id number]    Unit/Bed#: -01 Encounter: [id number]    Dose (cristina-units/min) Oxytocin: 0 cristina-units/min (recurrent lates)  Contraction Frequency (minutes): 6-10  Contraction Quality: Mild  Tachysystole: No   Cervical Dilation: 7  Cervical Effacement: 90  Fetal Station: -1  Baseline Rate: 130 bpm  FHR Category: Category II  Oxytocin Safety Progress Check: Safety check completed     Vital Signs:   Vitals:    08/09/22 1441   BP: 143/77   Pulse: 83   Resp: 20   Temp: 98 9 °F (37 2 °C)   SpO2:        Notes/comments:   Eren Blackburn is having some cramping occasionally- contractions have slowed down significantly since Pitocin was discontinued  MVUs are practically nonexistent  Fetal heart tracing now with occasional late and variable decelerations,non-recurrent with moderate variability and 15 x 15 spontaneous accelerations in addition to a large acceleration on cervical exam   Plan to restart pitocin titration back at 2 and monitor closely      Laila Khan MD 8/9/2022 3:34 PM

## 2022-08-09 NOTE — OB LABOR/OXYTOCIN SAFETY PROGRESS
Oxytocin Safety Progress Check Note - Donato Ayers 32 y o  female MRN: [id number]    Unit/Bed#: -01 Encounter: [id number]    Dose (cristina-units/min) Oxytocin: 10 cristina-units/min  Contraction Frequency (minutes): 1-3 5/ irregular  Contraction Quality: Mild  Tachysystole: No   Baseline Rate: 125 bpm  Fetal Heart Rate: 167 BPM  FHR Category: Category I        Vital Signs:   Vitals:    08/08/22 2159   BP:    Pulse:    Resp: 20   Temp:    SpO2:        Notes/comments:   Patient feeling some cramping, will continue to monitor and manage  Defer cervix check at this time      Richa Medrano MD 8/8/2022 10:09 PM

## 2022-08-09 NOTE — OB LABOR/OXYTOCIN SAFETY PROGRESS
Oxytocin Safety Progress Check Note - Delvin Cervantes 32 y o  female MRN: [id number]    Unit/Bed#: -01 Encounter: [id number]    Dose (cristina-units/min) Oxytocin: 0 cristina-units/min  Contraction Frequency (minutes): 2 5-4 5  Contraction Quality: Mild  Tachysystole: No   Baseline Rate: 115 bpm  Fetal Heart Rate: 116 BPM  FHR Category: Category I        Vital Signs:   Vitals:    08/09/22 0100   BP:    Pulse:    Resp: 20   Temp: 98 2 °F (36 8 °C)   SpO2:        Notes/comments:   Patient IV infiltrated, working to restart iv access  Defer check for now until establish line and contraction pattern      Lenard Arauz MD 8/9/2022 1:44 AM

## 2022-08-09 NOTE — OB LABOR/OXYTOCIN SAFETY PROGRESS
Labor Progress Note - Jimenez Rosenberg 32 y o  female MRN: [id number]    Unit/Bed#: -01 Encounter: [id number]    Dose (cristina-units/min) Oxytocin: 22 cristina-units/min  Contraction Frequency (minutes): 2-3 5  Contraction Quality: Mild  Tachysystole: No   Cervical Dilation: 4        Cervical Effacement: 60  Fetal Station: -3  Baseline Rate: 115 bpm  Fetal Heart Rate: 126 BPM  FHR Category: Category I               Vital Signs:   Vitals:    08/09/22 0609   BP: 118/56   Pulse: 90   Resp:    Temp:    SpO2:        Notes/comments:     Cervix now 4/60/-3, patient uncomfortable and zoran regularly  FHT with a known lower baseline of 115 BPM, reactive with moderate variability and no decelerations  Will continue Pitocin titration          Dorthy Bamberger, MD 8/9/2022 6:36 AM

## 2022-08-09 NOTE — ANESTHESIA PREPROCEDURE EVALUATION
Procedure:  LABOR ANALGESIA    Relevant Problems   GYN   (+) 41 weeks gestation of pregnancy      NEURO/PSYCH   (+) Anxiety and depression        Physical Exam    Airway    Mallampati score: II  TM Distance: >3 FB  Neck ROM: full     Dental       Cardiovascular      Pulmonary      Other Findings  obese      Anesthesia Plan  ASA Score- 3     Anesthesia Type- epidural with ASA Monitors  Additional Monitors:   Airway Plan:           Plan Factors-    Chart reviewed  Existing labs reviewed  Patient summary reviewed  Patient is not a current smoker  Induction-     Postoperative Plan-     Informed Consent- Anesthetic plan and risks discussed with patient  I personally reviewed this patient with the CRNA  Discussed and agreed on the Anesthesia Plan with the CRNA  Gerson Sosa

## 2022-08-09 NOTE — PLAN OF CARE
Problem: BIRTH - VAGINAL/ SECTION  Goal: Fetal and maternal status remain reassuring during the birth process  Description: INTERVENTIONS:  - Monitor vital signs  - Monitor fetal heart rate  - Monitor uterine activity  - Monitor labor progression (vaginal delivery)  - DVT prophylaxis  - Antibiotic prophylaxis  Outcome: Progressing  Goal: Emotionally satisfying birthing experience for mother/fetus  Description: Interventions:  - Assess, plan, implement and evaluate the nursing care given to the patient in labor  - Advocate the philosophy that each childbirth experience is a unique experience and support the family's chosen level of involvement and control during the labor process   - Actively participate in both the patient's and family's teaching of the birth process  - Consider cultural, Anabaptist and age-specific factors and plan care for the patient in labor  Outcome: Progressing     Problem: Knowledge Deficit  Goal: Verbalizes understanding of labor plan  Description: Assess patient/family/caregiver's baseline knowledge level and ability to understand information  Provide education via patient/family/caregiver's preferred learning method at appropriate level of understanding  1  Provide teaching at level of understanding  2  Provide teaching via preferred learning method(s)  Outcome: Progressing  Goal: Patient/family/caregiver demonstrates understanding of disease process, treatment plan, medications, and discharge instructions  Description: Complete learning assessment and assess knowledge base  Interventions:  - Provide teaching at level of understanding  - Provide teaching via preferred learning methods  Outcome: Progressing     Problem: Labor & Delivery  Goal: Manages discomfort  Description: Assess and monitor for signs and symptoms of discomfort  Assess patient's pain level regularly and per hospital policy  Administer medications as ordered   Support use of nonpharmacological methods to help control pain such as distraction, imagery, relaxation, and application of heat and cold  Collaborate with interdisciplinary team and patient to determine appropriate pain management plan  1  Include patient in decisions related to comfort  2  Offer non-pharmacological pain management interventions  3  Report ineffective pain management to physician  Outcome: Progressing  Goal: Patient vital signs are stable  Description: 1  Assess vital signs - vaginal delivery    Outcome: Progressing     Problem: PAIN - ADULT  Goal: Verbalizes/displays adequate comfort level or baseline comfort level  Description: Interventions:  - Encourage patient to monitor pain and request assistance  - Assess pain using appropriate pain scale  - Administer analgesics based on type and severity of pain and evaluate response  - Implement non-pharmacological measures as appropriate and evaluate response  - Consider cultural and social influences on pain and pain management  - Notify physician/advanced practitioner if interventions unsuccessful or patient reports new pain  Outcome: Progressing     Problem: INFECTION - ADULT  Goal: Absence or prevention of progression during hospitalization  Description: INTERVENTIONS:  - Assess and monitor for signs and symptoms of infection  - Monitor lab/diagnostic results  - Monitor all insertion sites, i e  indwelling lines, tubes, and drains  - Monitor endotracheal if appropriate and nasal secretions for changes in amount and color  - [place] appropriate cooling/warming therapies per order  - Administer medications as ordered  - Instruct and encourage patient and family to use good hand hygiene technique  - Identify and instruct in appropriate isolation precautions for identified infection/condition  Outcome: Progressing  Goal: Absence of fever/infection during neutropenic period  Description: INTERVENTIONS:  - Monitor WBC    Outcome: Progressing     Problem: SAFETY ADULT  Goal: Patient will remain free of falls  Description: INTERVENTIONS:  - Educate patient/family on patient safety including physical limitations  - Instruct patient to call for assistance with activity   - Consult OT/PT to assist with strengthening/mobility   - Keep Call bell within reach  - Keep bed low and locked with side rails adjusted as appropriate  - Keep care items and personal belongings within reach  - Initiate and maintain comfort rounds  - Make Fall Risk Sign visible to staff  - Consider moving patient to room near nurses station  Outcome: Progressing  Goal: Maintain or return to baseline ADL function  Description: INTERVENTIONS:  -  Assess patient's ability to carry out ADLs; assess patient's baseline for ADL function and identify physical deficits which impact ability to perform ADLs (bathing, care of mouth/teeth, toileting, grooming, dressing, etc )  - Assess/evaluate cause of self-care deficits   - Assess range of motion  - Assess patient's mobility; develop plan if impaired  - Assess patient's need for assistive devices and provide as appropriate  - Encourage maximum independence but intervene and supervise when necessary  - Involve family in performance of ADLs  - Assess for home care needs following discharge   - Consider OT consult to assist with ADL evaluation and planning for discharge  - Provide patient education as appropriate  Outcome: Progressing  Goal: Maintains/Returns to pre admission functional level  Description: INTERVENTIONS:  - Perform BMAT or MOVE assessment daily    - Set and communicate daily mobility goal to care team and patient/family/caregiver     - Collaborate with rehabilitation services on mobility goals if consulted  - Record patient progress and toleration of activity level   Outcome: Progressing     Problem: DISCHARGE PLANNING  Goal: Discharge to home or other facility with appropriate resources  Description: INTERVENTIONS:  - Identify barriers to discharge w/patient and caregiver  - Arrange for needed discharge resources and transportation as appropriate  - Identify discharge learning needs (meds, wound care, etc )  - Arrange for interpretive services to assist at discharge as needed  - Refer to Case Management Department for coordinating discharge planning if the patient needs post-hospital services based on physician/advanced practitioner order or complex needs related to functional status, cognitive ability, or social support system  Outcome: Progressing

## 2022-08-09 NOTE — OB LABOR/OXYTOCIN SAFETY PROGRESS
Oxytocin Safety Progress Check Note - Osmany Gonzales 32 y o  female MRN: [id number]    Unit/Bed#: -01 Encounter: [id number]    Dose (cristina-units/min) Oxytocin: 30 cristina-units/min  Contraction Frequency (minutes): 1 5-5  Contraction Quality: Mild  Tachysystole: No   Cervical Dilation: 4  Cervical Effacement: 80  Fetal Station: -2  Baseline Rate: 120 bpm  FHR Category: Category I  Oxytocin Safety Progress Check: Safety check completed    Vital Signs:   Vitals:    08/09/22 1011   BP: 145/80   Pulse: 89   Resp:    Temp:    SpO2:        Notes/comments:   Patient comfortable with epidural  Cervical exam as above- amniotomy performed at 10:15 a m  For clear fluid  Fetal heart tracing remained category 1  Continue Pitocin at current level at this point- if no change at next exam or contractions remain irregular plan for Pitocin break      Darrin Edouard MD 8/9/2022 10:20 AM

## 2022-08-09 NOTE — OB LABOR/OXYTOCIN SAFETY PROGRESS
Oxytocin Safety Progress Check Note - Audrey Sidle 32 y o  female MRN: [id number]    Unit/Bed#: -01 Encounter: [id number]    Dose (cristina-units/min) Oxytocin: 16 cristina-units/min  Contraction Frequency (minutes): 2 ctx  Contraction Quality: Mild  Tachysystole: No   Cervical Dilation: 3        Cervical Effacement: 60  Fetal Station: -3  Baseline Rate: 115 bpm  Fetal Heart Rate: 130 BPM  FHR Category: Category I               Vital Signs:   Vitals:    08/09/22 0423   BP: 130/62   Pulse: 73   Resp:    Temp:    SpO2:        Notes/comments: SVE deferred  FHT Cat I, toco q5  Discussed with attending          Swetha Cooney MD 8/9/2022 4:48 AM

## 2022-08-09 NOTE — OB LABOR/OXYTOCIN SAFETY PROGRESS
Oxytocin Safety Progress Check Note - Lavon Lines 32 y o  female MRN: [id number]    Unit/Bed#: -01 Encounter: [id number]    Dose (cristina-units/min) Oxytocin: 0 cristina-units/min (recurrent lates)  Contraction Frequency (minutes): 2-2 5  Contraction Quality: Strong  Tachysystole: No   Cervical Dilation: 7        Cervical Effacement: 90  Fetal Station: -1  Baseline Rate: 115 bpm  Fetal Heart Rate: 179 BPM  FHR Category: Category II  Oxytocin Safety Progress Check: Safety check completed            Vital Signs:   Vitals:    08/09/22 1426   BP: 134/77   Pulse: 104   Resp:    Temp:    SpO2:        Notes/comments:   Patient with evidence of recurrent variables and recently late decelerations [id number] FSE IUPC has been placed and Pitocin has decreased to 15 milliunits/minute  Persistent of late decelerations, pitocin was discontinue  Patient will have a Pitocin break at this time  Fetal tracing good return back to baseline  Moderate variability and accelerations present      Patient discussed with Dr Yina Cobb MD 8/9/2022 2:42 PM

## 2022-08-10 PROBLEM — Z3A.41 41 WEEKS GESTATION OF PREGNANCY: Status: RESOLVED | Noted: 2022-06-03 | Resolved: 2022-08-10

## 2022-08-10 PROBLEM — O48.0 41 WEEKS GESTATION OF PREGNANCY: Status: RESOLVED | Noted: 2022-06-03 | Resolved: 2022-08-10

## 2022-08-10 PROBLEM — Z98.891 STATUS POST PRIMARY LOW TRANSVERSE CESAREAN SECTION: Status: ACTIVE | Noted: 2022-08-10

## 2022-08-10 PROBLEM — O13.9 GESTATIONAL HYPERTENSION: Status: ACTIVE | Noted: 2022-08-10

## 2022-08-10 LAB
ALBUMIN SERPL BCP-MCNC: 2.6 G/DL (ref 3.5–5)
ALBUMIN SERPL BCP-MCNC: 3.1 G/DL (ref 3.5–5)
ALP SERPL-CCNC: 107 U/L (ref 34–104)
ALP SERPL-CCNC: 146 U/L (ref 34–104)
ALT SERPL W P-5'-P-CCNC: 17 U/L (ref 7–52)
ALT SERPL W P-5'-P-CCNC: 20 U/L (ref 7–52)
ANION GAP SERPL CALCULATED.3IONS-SCNC: 15 MMOL/L (ref 4–13)
ANION GAP SERPL CALCULATED.3IONS-SCNC: 7 MMOL/L (ref 4–13)
AST SERPL W P-5'-P-CCNC: 25 U/L (ref 13–39)
AST SERPL W P-5'-P-CCNC: 25 U/L (ref 13–39)
BASE EXCESS BLDCOA CALC-SCNC: -5.2 MMOL/L (ref 3–11)
BILIRUB SERPL-MCNC: 0.58 MG/DL (ref 0.2–1)
BILIRUB SERPL-MCNC: 0.74 MG/DL (ref 0.2–1)
BUN SERPL-MCNC: 8 MG/DL (ref 5–25)
BUN SERPL-MCNC: 9 MG/DL (ref 5–25)
CALCIUM ALBUM COR SERPL-MCNC: 9.1 MG/DL (ref 8.3–10.1)
CALCIUM ALBUM COR SERPL-MCNC: 9.6 MG/DL (ref 8.3–10.1)
CALCIUM SERPL-MCNC: 8 MG/DL (ref 8.4–10.2)
CALCIUM SERPL-MCNC: 8.9 MG/DL (ref 8.4–10.2)
CHLORIDE SERPL-SCNC: 105 MMOL/L (ref 96–108)
CHLORIDE SERPL-SCNC: 108 MMOL/L (ref 96–108)
CO2 SERPL-SCNC: 17 MMOL/L (ref 21–32)
CO2 SERPL-SCNC: 22 MMOL/L (ref 21–32)
CREAT SERPL-MCNC: 0.44 MG/DL (ref 0.6–1.3)
CREAT SERPL-MCNC: 0.44 MG/DL (ref 0.6–1.3)
ERYTHROCYTE [DISTWIDTH] IN BLOOD BY AUTOMATED COUNT: 13.8 % (ref 11.6–15.1)
GFR SERPL CREATININE-BSD FRML MDRD: 139 ML/MIN/1.73SQ M
GFR SERPL CREATININE-BSD FRML MDRD: 139 ML/MIN/1.73SQ M
GLUCOSE SERPL-MCNC: 79 MG/DL (ref 65–140)
GLUCOSE SERPL-MCNC: 90 MG/DL (ref 65–140)
HCO3 BLDCOA-SCNC: 22.2 MMOL/L (ref 17.3–27.3)
HCT VFR BLD AUTO: 31.6 % (ref 34.8–46.1)
HGB BLD-MCNC: 10.7 G/DL (ref 11.5–15.4)
MCH RBC QN AUTO: 32.2 PG (ref 26.8–34.3)
MCHC RBC AUTO-ENTMCNC: 33.9 G/DL (ref 31.4–37.4)
MCV RBC AUTO: 95 FL (ref 82–98)
O2 CT VFR BLDCOA CALC: 11.9 ML/DL
OXYHGB MFR BLDCOA: 52 %
PCO2 BLDCOA: 49.5 MM[HG] (ref 30–60)
PH BLDCOA: 7.27 [PH] (ref 7.23–7.43)
PLATELET # BLD AUTO: 239 THOUSANDS/UL (ref 149–390)
PMV BLD AUTO: 9.3 FL (ref 8.9–12.7)
PO2 BLDCOA: 26.6 MM HG (ref 5–25)
POTASSIUM SERPL-SCNC: 3.4 MMOL/L (ref 3.5–5.3)
POTASSIUM SERPL-SCNC: 3.9 MMOL/L (ref 3.5–5.3)
PROT SERPL-MCNC: 5 G/DL (ref 6.4–8.4)
PROT SERPL-MCNC: 6 G/DL (ref 6.4–8.4)
RBC # BLD AUTO: 3.32 MILLION/UL (ref 3.81–5.12)
SODIUM SERPL-SCNC: 137 MMOL/L (ref 135–147)
SODIUM SERPL-SCNC: 137 MMOL/L (ref 135–147)
WBC # BLD AUTO: 18.35 THOUSAND/UL (ref 4.31–10.16)

## 2022-08-10 PROCEDURE — 99024 POSTOP FOLLOW-UP VISIT: CPT | Performed by: OBSTETRICS & GYNECOLOGY

## 2022-08-10 PROCEDURE — 85027 COMPLETE CBC AUTOMATED: CPT | Performed by: OBSTETRICS & GYNECOLOGY

## 2022-08-10 PROCEDURE — 59510 CESAREAN DELIVERY: CPT | Performed by: STUDENT IN AN ORGANIZED HEALTH CARE EDUCATION/TRAINING PROGRAM

## 2022-08-10 PROCEDURE — 80053 COMPREHEN METABOLIC PANEL: CPT

## 2022-08-10 PROCEDURE — 80053 COMPREHEN METABOLIC PANEL: CPT | Performed by: OBSTETRICS & GYNECOLOGY

## 2022-08-10 RX ORDER — ACETAMINOPHEN 325 MG/1
650 TABLET ORAL EVERY 6 HOURS
Status: DISCONTINUED | OUTPATIENT
Start: 2022-08-10 | End: 2022-08-11

## 2022-08-10 RX ORDER — ALBUTEROL SULFATE 2.5 MG/3ML
2.5 SOLUTION RESPIRATORY (INHALATION) ONCE AS NEEDED
Status: DISCONTINUED | OUTPATIENT
Start: 2022-08-10 | End: 2022-08-10

## 2022-08-10 RX ORDER — HYDROMORPHONE HCL/PF 1 MG/ML
0.5 SYRINGE (ML) INJECTION
Status: DISCONTINUED | OUTPATIENT
Start: 2022-08-10 | End: 2022-08-11

## 2022-08-10 RX ORDER — HYDROMORPHONE HCL/PF 1 MG/ML
0.5 SYRINGE (ML) INJECTION EVERY 2 HOUR PRN
Status: DISCONTINUED | OUTPATIENT
Start: 2022-08-10 | End: 2022-08-11

## 2022-08-10 RX ORDER — FENTANYL CITRATE/PF 50 MCG/ML
50 SYRINGE (ML) INJECTION
Status: DISCONTINUED | OUTPATIENT
Start: 2022-08-10 | End: 2022-08-11

## 2022-08-10 RX ORDER — OXYTOCIN/RINGER'S LACTATE 30/500 ML
62.5 PLASTIC BAG, INJECTION (ML) INTRAVENOUS CONTINUOUS
Status: ACTIVE | OUTPATIENT
Start: 2022-08-10 | End: 2022-08-10

## 2022-08-10 RX ORDER — HYDROMORPHONE HCL/PF 1 MG/ML
0.5 SYRINGE (ML) INJECTION
Status: DISCONTINUED | OUTPATIENT
Start: 2022-08-10 | End: 2022-08-10

## 2022-08-10 RX ORDER — ENOXAPARIN SODIUM 100 MG/ML
40 INJECTION SUBCUTANEOUS EVERY 12 HOURS SCHEDULED
Status: DISCONTINUED | OUTPATIENT
Start: 2022-08-10 | End: 2022-08-11

## 2022-08-10 RX ORDER — ONDANSETRON 2 MG/ML
4 INJECTION INTRAMUSCULAR; INTRAVENOUS ONCE AS NEEDED
Status: DISCONTINUED | OUTPATIENT
Start: 2022-08-10 | End: 2022-08-12 | Stop reason: HOSPADM

## 2022-08-10 RX ORDER — DEXAMETHASONE SODIUM PHOSPHATE 4 MG/ML
8 INJECTION, SOLUTION INTRA-ARTICULAR; INTRALESIONAL; INTRAMUSCULAR; INTRAVENOUS; SOFT TISSUE ONCE AS NEEDED
Status: DISCONTINUED | OUTPATIENT
Start: 2022-08-10 | End: 2022-08-11

## 2022-08-10 RX ORDER — CALCIUM CARBONATE 200(500)MG
1000 TABLET,CHEWABLE ORAL DAILY PRN
Status: DISCONTINUED | OUTPATIENT
Start: 2022-08-10 | End: 2022-08-12 | Stop reason: HOSPADM

## 2022-08-10 RX ORDER — OXYCODONE HYDROCHLORIDE 5 MG/1
5 TABLET ORAL EVERY 4 HOURS PRN
Status: DISCONTINUED | OUTPATIENT
Start: 2022-08-10 | End: 2022-08-11 | Stop reason: SDUPTHER

## 2022-08-10 RX ORDER — DIAPER,BRIEF,INFANT-TODD,DISP
1 EACH MISCELLANEOUS DAILY PRN
Status: DISCONTINUED | OUTPATIENT
Start: 2022-08-10 | End: 2022-08-12 | Stop reason: HOSPADM

## 2022-08-10 RX ORDER — KETOROLAC TROMETHAMINE 30 MG/ML
15 INJECTION, SOLUTION INTRAMUSCULAR; INTRAVENOUS EVERY 6 HOURS
Status: DISPENSED | OUTPATIENT
Start: 2022-08-10 | End: 2022-08-10

## 2022-08-10 RX ORDER — NALOXONE HYDROCHLORIDE 0.4 MG/ML
0.1 INJECTION, SOLUTION INTRAMUSCULAR; INTRAVENOUS; SUBCUTANEOUS
Status: DISCONTINUED | OUTPATIENT
Start: 2022-08-10 | End: 2022-08-11

## 2022-08-10 RX ORDER — DOCUSATE SODIUM 100 MG/1
100 CAPSULE, LIQUID FILLED ORAL 2 TIMES DAILY
Status: DISCONTINUED | OUTPATIENT
Start: 2022-08-10 | End: 2022-08-12 | Stop reason: HOSPADM

## 2022-08-10 RX ORDER — DIPHENHYDRAMINE HYDROCHLORIDE 50 MG/ML
25 INJECTION INTRAMUSCULAR; INTRAVENOUS EVERY 6 HOURS PRN
Status: DISCONTINUED | OUTPATIENT
Start: 2022-08-10 | End: 2022-08-11 | Stop reason: SDUPTHER

## 2022-08-10 RX ORDER — SODIUM CHLORIDE, SODIUM LACTATE, POTASSIUM CHLORIDE, CALCIUM CHLORIDE 600; 310; 30; 20 MG/100ML; MG/100ML; MG/100ML; MG/100ML
125 INJECTION, SOLUTION INTRAVENOUS CONTINUOUS
Status: DISCONTINUED | OUTPATIENT
Start: 2022-08-10 | End: 2022-08-11

## 2022-08-10 RX ORDER — NALBUPHINE HCL 10 MG/ML
10 AMPUL (ML) INJECTION
Status: DISCONTINUED | OUTPATIENT
Start: 2022-08-10 | End: 2022-08-12 | Stop reason: HOSPADM

## 2022-08-10 RX ORDER — IBUPROFEN 400 MG/1
400 TABLET ORAL EVERY 6 HOURS PRN
Status: COMPLETED | OUTPATIENT
Start: 2022-08-10 | End: 2022-08-11

## 2022-08-10 RX ORDER — KETOROLAC TROMETHAMINE 30 MG/ML
INJECTION, SOLUTION INTRAMUSCULAR; INTRAVENOUS AS NEEDED
Status: DISCONTINUED | OUTPATIENT
Start: 2022-08-10 | End: 2022-08-10

## 2022-08-10 RX ORDER — METOCLOPRAMIDE HYDROCHLORIDE 5 MG/ML
5 INJECTION INTRAMUSCULAR; INTRAVENOUS EVERY 6 HOURS PRN
Status: DISCONTINUED | OUTPATIENT
Start: 2022-08-10 | End: 2022-08-11

## 2022-08-10 RX ORDER — OXYTOCIN/RINGER'S LACTATE 30/500 ML
62.5 PLASTIC BAG, INJECTION (ML) INTRAVENOUS CONTINUOUS
Status: CANCELLED | OUTPATIENT
Start: 2022-08-10

## 2022-08-10 RX ORDER — ACETAMINOPHEN 325 MG/1
650 TABLET ORAL EVERY 6 HOURS SCHEDULED
Status: DISCONTINUED | OUTPATIENT
Start: 2022-08-10 | End: 2022-08-12 | Stop reason: HOSPADM

## 2022-08-10 RX ORDER — DIPHENHYDRAMINE HYDROCHLORIDE 50 MG/ML
25 INJECTION INTRAMUSCULAR; INTRAVENOUS EVERY 6 HOURS PRN
Status: DISCONTINUED | OUTPATIENT
Start: 2022-08-10 | End: 2022-08-10

## 2022-08-10 RX ORDER — IBUPROFEN 400 MG/1
400 TABLET ORAL EVERY 8 HOURS PRN
Status: DISCONTINUED | OUTPATIENT
Start: 2022-08-11 | End: 2022-08-10

## 2022-08-10 RX ORDER — OXYCODONE HYDROCHLORIDE 5 MG/1
10 TABLET ORAL EVERY 4 HOURS PRN
Status: DISCONTINUED | OUTPATIENT
Start: 2022-08-10 | End: 2022-08-11 | Stop reason: SDUPTHER

## 2022-08-10 RX ORDER — ONDANSETRON 2 MG/ML
4 INJECTION INTRAMUSCULAR; INTRAVENOUS EVERY 4 HOURS PRN
Status: DISCONTINUED | OUTPATIENT
Start: 2022-08-10 | End: 2022-08-11

## 2022-08-10 RX ADMIN — DOCUSATE SODIUM 100 MG: 100 CAPSULE, LIQUID FILLED ORAL at 17:57

## 2022-08-10 RX ADMIN — ACETAMINOPHEN 650 MG: 325 TABLET ORAL at 08:04

## 2022-08-10 RX ADMIN — OXYCODONE HYDROCHLORIDE 10 MG: 5 TABLET ORAL at 20:56

## 2022-08-10 RX ADMIN — VALACYCLOVIR HYDROCHLORIDE 1000 MG: 500 TABLET, FILM COATED ORAL at 08:04

## 2022-08-10 RX ADMIN — IBUPROFEN 400 MG: 400 TABLET, FILM COATED ORAL at 17:56

## 2022-08-10 RX ADMIN — ENOXAPARIN SODIUM 40 MG: 40 INJECTION SUBCUTANEOUS at 17:56

## 2022-08-10 RX ADMIN — ACETAMINOPHEN 650 MG: 325 TABLET ORAL at 15:36

## 2022-08-10 RX ADMIN — Medication 62.5 MILLI-UNITS/MIN: at 01:00

## 2022-08-10 RX ADMIN — NALBUPHINE HYDROCHLORIDE 10 MG: 10 INJECTION, SOLUTION INTRAMUSCULAR; INTRAVENOUS; SUBCUTANEOUS at 05:30

## 2022-08-10 RX ADMIN — DIPHENHYDRAMINE HYDROCHLORIDE 25 MG: 50 INJECTION, SOLUTION INTRAMUSCULAR; INTRAVENOUS at 03:37

## 2022-08-10 RX ADMIN — KETOROLAC TROMETHAMINE 30 MG: 30 INJECTION, SOLUTION INTRAMUSCULAR at 00:38

## 2022-08-10 RX ADMIN — KETOROLAC TROMETHAMINE 15 MG: 30 INJECTION, SOLUTION INTRAMUSCULAR at 07:05

## 2022-08-10 NOTE — PLAN OF CARE
Problem: Knowledge Deficit  Goal: Verbalizes understanding of labor plan  Description: Assess patient/family/caregiver's baseline knowledge level and ability to understand information  Provide education via patient/family/caregiver's preferred learning method at appropriate level of understanding  1  Provide teaching at level of understanding  2  Provide teaching via preferred learning method(s)  Outcome: Progressing  Goal: Patient/family/caregiver demonstrates understanding of disease process, treatment plan, medications, and discharge instructions  Description: Complete learning assessment and assess knowledge base    Interventions:  - Provide teaching at level of understanding  - Provide teaching via preferred learning methods  Outcome: Progressing     Problem: PAIN - ADULT  Goal: Verbalizes/displays adequate comfort level or baseline comfort level  Description: Interventions:  - Encourage patient to monitor pain and request assistance  - Assess pain using appropriate pain scale  - Administer analgesics based on type and severity of pain and evaluate response  - Implement non-pharmacological measures as appropriate and evaluate response  - Consider cultural and social influences on pain and pain management  - Notify physician/advanced practitioner if interventions unsuccessful or patient reports new pain  Outcome: Progressing     Problem: INFECTION - ADULT  Goal: Absence or prevention of progression during hospitalization  Description: INTERVENTIONS:  - Assess and monitor for signs and symptoms of infection  - Monitor lab/diagnostic results  - Monitor all insertion sites, i e  indwelling lines, tubes, and drains  - Monitor endotracheal if appropriate and nasal secretions for changes in amount and color  - [place] appropriate cooling/warming therapies per order  - Administer medications as ordered  - Instruct and encourage patient and family to use good hand hygiene technique  - Identify and instruct in appropriate isolation precautions for identified infection/condition  Outcome: Progressing  Goal: Absence of fever/infection during neutropenic period  Description: INTERVENTIONS:  - Monitor WBC    Outcome: Progressing     Problem: SAFETY ADULT  Goal: Patient will remain free of falls  Description: INTERVENTIONS:  - Educate patient/family on patient safety including physical limitations  - Instruct patient to call for assistance with activity   - Consult OT/PT to assist with strengthening/mobility   - Keep Call bell within reach  - Keep bed low and locked with side rails adjusted as appropriate  - Keep care items and personal belongings within reach  - Initiate and maintain comfort rounds  - Make Fall Risk Sign visible to staff  - - Apply yellow socks and bracelet for high fall risk patients  - Consider moving patient to room near nurses station  Outcome: Progressing  Goal: Maintain or return to baseline ADL function  Description: INTERVENTIONS:  -  Assess patient's ability to carry out ADLs; assess patient's baseline for ADL function and identify physical deficits which impact ability to perform ADLs (bathing, care of mouth/teeth, toileting, grooming, dressing, etc )  - Assess/evaluate cause of self-care deficits   - Assess range of motion  - Assess patient's mobility; develop plan if impaired  - Assess patient's need for assistive devices and provide as appropriate  - Encourage maximum independence but intervene and supervise when necessary  - Involve family in performance of ADLs  - Assess for home care needs following discharge   - Consider OT consult to assist with ADL evaluation and planning for discharge  - Provide patient education as appropriate  Outcome: Progressing  Goal: Maintains/Returns to pre admission functional level  Description: INTERVENTIONS:  - Perform BMAT or MOVE assessment daily    - Set and communicate daily mobility goal to care team and patient/family/caregiver     - Collaborate with rehabilitation services on mobility goals if consulted  - - Out of bed for toileting  - Record patient progress and toleration of activity level   Outcome: Progressing     Problem: DISCHARGE PLANNING  Goal: Discharge to home or other facility with appropriate resources  Description: INTERVENTIONS:  - Identify barriers to discharge w/patient and caregiver  - Arrange for needed discharge resources and transportation as appropriate  - Identify discharge learning needs (meds, wound care, etc )  - Arrange for interpretive services to assist at discharge as needed  - Refer to Case Management Department for coordinating discharge planning if the patient needs post-hospital services based on physician/advanced practitioner order or complex needs related to functional status, cognitive ability, or social support system  Outcome: Progressing

## 2022-08-10 NOTE — OB LABOR/OXYTOCIN SAFETY PROGRESS
Oxytocin Safety Progress Check Note - Ricarda Leahy 32 y o  female MRN: [id number]    Unit/Bed#: -01 Encounter: [id number]    Dose (cristina-units/min) Oxytocin: 10 cristina-units/min  Contraction Frequency (minutes): 2-3  Contraction Quality: Strong  Tachysystole: No   Cervical Dilation: Lip/rim (Comment)        Cervical Effacement: 100  Fetal Station: 0  Baseline Rate: 120 bpm  Fetal Heart Rate: 120 BPM  FHR Category: Category II  Oxytocin Safety Progress Check: Safety check completed            Vital Signs:   Vitals:    08/09/22 1955   BP: 120/58   Pulse: 96   Resp:    Temp:    SpO2:        Notes/comments:   Patient still feeling a lot of vaginal pressure, pain not much improved after bolus from anaesthesia  FHT category II with a recent 1-2 minute deceleration with babar of 60 after she was placed into high fowlers  She was repositioned on to her side and decelerations resolved       Will continue to monitor closely        Ray Carnes MD 8/9/2022 9:06 PM

## 2022-08-10 NOTE — ASSESSMENT & PLAN NOTE
QBL 1004, Hgb 12 5 --post-op Hgb 10 7  Hutn removed, passed void trial  Pain: Tylenol and toradol scheduled, rebecca 5/10 PRN    FEN: Tolerating regular diet  DVT ppx: SCDs and  Lovenox 40mg qD  Passing flatus   Incision covered with mepilex dressing  Encourage ambulation

## 2022-08-10 NOTE — ANESTHESIA POSTPROCEDURE EVALUATION
Post-Op Assessment Note    CV Status:  Stable  Pain Score: 0    Pain management: adequate     Mental Status:  Arousable   Hydration Status:  Stable   PONV Controlled:  None   Airway Patency:  Patent      Post Op Vitals Reviewed: Yes      Staff: Anesthesiologist, CRNA     Post-op block assessment: no complications and catheter intact      No complications documented      BP   118/64   Temp 98   Pulse 70   Resp 16   O2 95 RA

## 2022-08-10 NOTE — ASSESSMENT & PLAN NOTE
Systolic (94FSQ), KHL:583 , Min:86 , LEQ:987   Diastolic (27ORC), SYO:52, Min:49, Max:59    S/p Labetalol given on 08/10/2022  CBC, CMP P:C ( 0 21) wnl Benign prostatic hyperplasia with urinary frequency

## 2022-08-10 NOTE — DISCHARGE SUMMARY
Discharge Summary - OB/GYN   Dorene Isaac 32 y o  female MRN: [id number]  Unit/Bed#: -01 Encounter: [id number]      Admission Date: 2022     Discharge Date: 2022    Admitting Diagnosis:   1  40 week 4 day pregnancy  2  BMI 55  3  Gestational HTN  4  THC use    Discharge Diagnosis:   Same, delivered      Procedures: primary  section, low transverse incision    Admitting Attending: Dr Gautam Go  Delivery Attending: Cyndi Walters MD   Discharge Attending: Dr Teresita Rose Course: This is a 33 yo  at [address] [place] [address] who presented for an induction of labor for post dates  On presentation, her cervix was 3 and she was started on Pitocin  She ultimately had a Pitocin break and was artificially ruptured  She received an epidural for analgesia  An IUPC and FSE was placed to better monitor contractions and fetal heart rate  She ultimately progressed to complete cervical dilation  She pushed for approximately 1 hour before reporting that she was very uncomfortable with pushing and strongly desired a   She delivered a viable male  on 22 at 2349  Weight 8lbs 15 8oz via primary  section, low transverse incision  Apgars were 8 (1 min) and 9 (5 min)   was transferred to  nursery  Patient tolerated the procedure well and was transferred to recovery in stable condition  Her post-operative course was uncomplicated  Preoperative hemoglobin was 12 5, postoperative was 10 7  Her postoperative pain was well controlled with oral analgesics  On day of discharge, she was ambulating and able to reasonably perform all ADLs  She was voiding and had appropriate bowel function  Pain was well controlled  She was discharged home on post-operative day #3 without complications   Patient was instructed to follow up with her OB as an outpatient and was given appropriate warnings to call provider if she develops signs of infection or uncontrolled pain     Complications: none apparent    Condition at discharge: good     Discharge instructions/Information to patient and family:   See after visit summary for information provided to patient and family  Provisions for Follow-Up Care:  See after visit summary for information related to follow-up care and any pertinent home health orders  Disposition: Home    Planned Readmission: No    Discharge Medications: For a complete list of the patient's medications, please refer to her med rec      Jasmine Johnson MD  PGY-1  OBGYN

## 2022-08-10 NOTE — PROGRESS NOTES
Progress Note - OB/GYN  Dorene Isaac 32 y o  female MRN: [id number]  Unit/Bed#: -01 Encounter: [id number]    Assessment and [address] [facility] is a patient of: [facility]   She is POD# 1 s/p 1LTCS   Recovering well and is stable       41 weeks gestation of pregnancy-resolved as of 8/10/2022  Assessment & Plan        * Status post primary low transverse  section  Assessment & Plan  QBL 1004, Hgb 12 5 --> post op Hgb pending  Lines: hernandez in place , output 0 89cc/kg/hr  Pain: Tylenol and toradol scheduled, rebecca 5/10 PRN    FEN: Tolerating regular diet  DVT ppx: SCDs and  Lovenox 40mg qD  Passing flatus   Incision covered with mepilex dressing      Gestational hypertension  Assessment & Plan  Systolic (40FWR), XYF:012 , Min:102 , KST:255   Diastolic (53EYV), UCZ:27, Min:51, Max:98    S/p Labetalol given on 08/10/2022  CBC, CMP P:C ( 0 21) wnl    Genital herpes  Assessment & Plan  Continue suppressive therapy  Valtrex 1000mg daily  Disposition    - Anticipate discharge home on POD# 3-4      Subjective/Objective     Chief Complaint: Postpartum State     Subjective:    Dorene Isaac is POD#1 s/p 1LTCS   She currently complains of diffuse itching that is not relieved by Benadryl  Pain is well controlled  Patient is not currently voiding  Hernandez is still in place  She is not ambulating  Patient is currently passing flatus and has had no bowel movement  She is tolerating PO, and denies nausea or vomitting  Patient denies fever, chills, chest pain, shortness of breath, or calf tenderness  Lochia is normal  She is  Breastfeeding  She is recovering well and is stable         Vitals:   /55 (BP Location: Left arm)   Pulse 103 Comment: NURSE NOTIFIED  Temp 98 3 °F (36 8 °C) (Oral)   Resp 20   Ht 5' 6" (1 676 m)   Wt (!) 156 kg (345 lb)   LMP 10/23/2021   SpO2 96%   Breastfeeding Yes   BMI 55 68 kg/m²       Intake/Output Summary (Last 24 hours) at 8/10/2022 0545  Last data filed at 8/10/2022 0201  Gross per 24 hour   Intake 2201 1 ml   Output 3075 ml   Net -873 9 ml       Invasive Devices  Timeline    Peripheral Intravenous Line  Duration           Peripheral IV 08/09/22 Right Antecubital 1 day    Peripheral IV 08/09/22 Dorsal (posterior); Left Hand <1 day          Drain  Duration           Urethral Catheter Non-latex 16 Fr  <1 day                Physical Exam:   GEN: Justina Sharpe appears well, alert and oriented x 3, pleasant and cooperative   CARDIO: RRR, no murmurs or rubs  RESP:  CTAB, no wheezes or rales  ABDOMEN: soft, no tenderness, no distention, fundus @ 2 cm below U , Incision covered with Mepilex dressing  EXTREMITIES: SCDs on, non tender, no erythema      Labs:     Hemoglobin   Date Value Ref Range Status   08/08/2022 12 5 11 5 - 15 4 g/dL Final   05/02/2022 12 6 11 1 - 15 9 g/dL Final     WBC   Date Value Ref Range Status   08/08/2022 11 49 (H) 4 31 - 10 16 Thousand/uL Final     White Blood Cell Count   Date Value Ref Range Status   05/02/2022 13 5 (H) 3 4 - 10 8 x10E3/uL Final     Platelet Count   Date Value Ref Range Status   05/02/2022 304 150 - 450 x10E3/uL Final     Platelets   Date Value Ref Range Status   08/08/2022 257 149 - 390 Thousands/uL Final     Creatinine   Date Value Ref Range Status   08/10/2022 0 44 (L) 0 60 - 1 30 mg/dL Final     Comment:     Standardized to IDMS reference method     AST   Date Value Ref Range Status   08/10/2022 25 13 - 39 U/L Final     Comment:     Specimen collection should occur prior to Sulfasalazine administration due to the potential for falsely depressed results  ALT   Date Value Ref Range Status   08/10/2022 20 7 - 52 U/L Final     Comment:     Specimen collection should occur prior to Sulfasalazine administration due to the potential for falsely depressed results             Janel Kunz MD  8/10/2022  5:45 AM

## 2022-08-10 NOTE — ADDENDUM NOTE
Addendum  created 08/10/22 1547 by Jude Wisdom CRNA    Order list changed, Pharmacy for encounter modified

## 2022-08-10 NOTE — OB LABOR/OXYTOCIN SAFETY PROGRESS
Oxytocin Safety Progress Check Note - Brayan Elena 32 y o  female MRN: [id number]    Unit/Bed#: -01 Encounter: [id number]    Dose (cristina-units/min) Oxytocin: 10 cristina-units/min  Contraction Frequency (minutes): 3-4  Contraction Quality: Moderate  Tachysystole: No   Cervical Dilation: Lip/rim (Comment)  Cervical Effacement: 100  Fetal Station: 0  Baseline Rate: 120 bpm  Fetal Heart Rate: 120 BPM  FHR Category: Category I  Oxytocin Safety Progress Check: Safety check completed     Vital Signs:   Vitals:    08/09/22 1955   BP: 120/58   Pulse: 96   Resp:    Temp:    SpO2:        Notes/comments:   After trial of pushing there remains a lip of cervix anteriorly that is nt educible with pushing  Fetus remains at 0 station- there is caput of fetal head and position is difficult to palpate  Plan for rebolus of epidural as Ezio Reilly is very uncomfortable and reposition with peanut ball to try to get baby lower in pelvis  Reassess in 1-2 hours or sooner if uncomfortable    Philippe Miller MD 8/9/2022 8:24 PM

## 2022-08-10 NOTE — OP NOTE
OPERATIVE REPORT  PATIENT NAME: Gema Knight    :  1995  MRN: [id number]  Pt Location: * No operating room entered *    SURGERY DATE: 2022    Surgeon(s) and Role:     * Robert Harris MD - Primary     * Reymundo Joy MD - Assisting    Procedure(s) (LRB):   SECTION () (N/A)    Specimen(s):  ID Type Source Tests Collected by Time Destination   A :  Cord Blood Cord BLOOD GAS, VENOUS, CORD, BLOOD GAS, ARTERIAL, CORD Robert Harris MD 2022 2349    B :  Tissue (Placenta on Hold) OB Only Placenta PLACENTA IN STORAGE Robert Harris MD 2022 2350          Drains:  Urethral Catheter Non-latex 16 Fr  (Active)   Goal for Removal Remove POD#1 08/10/22 0100   Site Assessment Clean;Skin intact 08/10/22 0100   Hunt Care Done 08/10/22 0100   Collection Container Standard drainage bag 08/10/22 0100   Securement Method Securing device (Describe) 22 1305   Output (mL) 750 mL 22 2300   Number of days: 1     Operative Report - OB/GYN   Gema Knight 32 y o  female MRN: [id number]  Unit/Bed#: -01 Encounter: [id number]    Indications: Maternal Request     Pre-operative Diagnosis:   1  40 week 4 day pregnancy  2  BMI 55  3  Gestational HTN  4  THC use    Post-operative Diagnosis: same, delivered    Surgeon: Robert Harris MD    Assistant(s): Janette Heredia MD    Findings:  1  Delivery of viable male at 2349, weight 8lbs 15 8oz;  Apgar scores of 8 at one minute and 9 at five minutes  2  Normal appearing placenta with centrally-inserted 3 vessel cord  3  Clear amniotic fluid  4  Arcuate uterus; normal fallopian tubes and ovaries    Specimens:   1  Arterial and venous cord gases  2  Cord blood  3  Segment of umbilical cord  4  Placenta to storage     Quantified Blood Loss:  1004 mL    Drains: Hunt catheter           Complications:  None; patient tolerated the procedure well             Disposition: PACU            Condition: stable    Hospital Course: This is a 31 yo  at [address] [place] [address] who presented for an induction of labor for post dates  On presentation, her cervix was 3 and she was started on Pitocin  She ultimately had a Pitocin break and was artificially ruptured  She received an epidural for analgesia  An IUPC and FSE was placed to better monitor contractions and fetal heart rate  She ultimately progressed to complete cervical dilation  She pushed for approximately 1 hour before reporting that she was very uncomfortable with pushing and strongly desired a   Procedure Details   Decision was made to proceed with  section due to maternal exhaustion  Patient was made aware of these findings and the proposed plan  Risks, benefits, possible complications, alternate treatment options, and expected outcomes were discussed with the patient  The patient agreed with the proposed plan and gave informed consent  The patient was taken to the operating room where she was properly identified to the OR staff and attending physician  She had already received epidural anesthesia during her labor course, which was augmented  preoperatively  Fetal heart tones were appreciated and found to be appropriate  A Hunt catheter had already been aseptically inserted and SCDs were placed  The Fetal Pillow was placed and inflated with 180 cc of sterile fluid  The abdomen was prepped with Chloraprep and following appropriate drying time, the patient was draped in the usual sterile manner for a Pfannenstiel incision  The patient had received Clindamycin, Gentamycin, and Azithromycin IV pre-operatively for prophylaxis  A Time Out was held and the above information confirmed  The patient was identified as Lavon Lines and the procedure verified as  selivery  A Pfannenstiel incision was made and carried down through the underlying subcutaneous tissue to the fascia using a scalpel   Rectus fascia was then incised in the midline and extended laterally using Magana scissors  The superior edge of the fascia was grasped with Kocher clamps, tented upward, and the underlying muscle was bluntly and sharply dissected off with a scalpel  The inferior edge was grasped with Kocher clamps and cleared in similar fashion  All anatomic layers were well-demarcated  The rectus muscles were  and the peritoneum was identified and subsequently entered and extended longitudinally with blunt dissection  The Williams retractor was inserted  A low transverse uterine incision was made with the scalpel and extended in a cephalocaudad direction with blunt dissection  The amnion was entered sharply  Surgeon's hand was inserted through the hysterotomy and the fetal head was palpated, elevated, and delivered through the uterine incision with the assistance of fundal pressure  Baby had spontaneous cry with good color and tone  The umbilical cord was clamped and cut  The infant was handed off to the  providers  Arterial and venous cord gases, cord blood, and a segment of umbilical cord were obtained for evaluation and promptly sent to the lab  The placenta delivered spontaneously with uterine fundal massage  The uterus was exteriorized and a moist lap sponge was used to clear the cavity of clots and products of conception  The uterine incision was closed with a running locked suture of 0 Vicryl  A second layer of the same suture was used to imbricate the first  Good hemostasis was confirmed upon uterine closure  The uterus was returned to the abdomen and the Williams retractor was removed  The paracolic gutters were inspected and cleared of all clots and debris with irrigation and moist lap sponges  There was a stable 3 cm hematoma noted on the left uterine serosa  Surgifoam was placed along the hysterotomy  The fascia was then closed with two separate running sutures of 0 Vicryl  Subcutaneous tissues were closed in two layers with Plain Gut suture   The skin was closed with 4-0 Monocryl in a subcuticular fashion  Mepilex dressing was applied and an abdominal binder was then placed  Th e Fetal Pillow was then removed  At the conclusion of the procedure, all needle, sponge, and instrument counts were noted to be correct x2  The patient tolerated the procedure well and was transferred to her the recovery room in stable condition  Dr Shea Munoz was present and participated in all key portions of the case            SIGNATURE: Tracy Barrios MD  DATE: August 10, 2022  TIME: 1:22 AM

## 2022-08-10 NOTE — ADDENDUM NOTE
Addendum  created 08/10/22 1536 by Elmer Brown CRNA    Order list changed, Pharmacy for encounter modified

## 2022-08-10 NOTE — PLAN OF CARE
Problem: PAIN - ADULT  Goal: Verbalizes/displays adequate comfort level or baseline comfort level  Description: Interventions:  - Encourage patient to monitor pain and request assistance  - Assess pain using appropriate pain scale  - Administer analgesics based on type and severity of pain and evaluate response  - Implement non-pharmacological measures as appropriate and evaluate response  - Consider cultural and social influences on pain and pain management  - Notify physician/advanced practitioner if interventions unsuccessful or patient reports new pain  Outcome: Progressing     Problem: INFECTION - ADULT  Goal: Absence or prevention of progression during hospitalization  Description: INTERVENTIONS:  - Assess and monitor for signs and symptoms of infection  - Monitor lab/diagnostic results  - Monitor all insertion sites, i e  indwelling lines, tubes, and drains  - Monitor endotracheal if appropriate and nasal secretions for changes in amount and color  - [place] appropriate cooling/warming therapies per order  - Administer medications as ordered  - Instruct and encourage patient and family to use good hand hygiene technique  - Identify and instruct in appropriate isolation precautions for identified infection/condition  Outcome: Progressing  Goal: Absence of fever/infection during neutropenic period  Description: INTERVENTIONS:  - Monitor WBC    Outcome: Progressing     Problem: SAFETY ADULT  Goal: Patient will remain free of falls  Description: INTERVENTIONS:  - Educate patient/family on patient safety including physical limitations  - Instruct patient to call for assistance with activity   - Consult OT/PT to assist with strengthening/mobility   - Keep Call bell within reach  - Keep bed low and locked with side rails adjusted as appropriate  - Keep care items and personal belongings within reach  - Initiate and maintain comfort rounds  - Make Fall Risk Sign visible to staff  - - Apply yellow socks and bracelet for high fall risk patients  - Consider moving patient to room near nurses station  Outcome: Progressing  Goal: Maintain or return to baseline ADL function  Description: INTERVENTIONS:  -  Assess patient's ability to carry out ADLs; assess patient's baseline for ADL function and identify physical deficits which impact ability to perform ADLs (bathing, care of mouth/teeth, toileting, grooming, dressing, etc )  - Assess/evaluate cause of self-care deficits   - Assess range of motion  - Assess patient's mobility; develop plan if impaired  - Assess patient's need for assistive devices and provide as appropriate  - Encourage maximum independence but intervene and supervise when necessary  - Involve family in performance of ADLs  - Assess for home care needs following discharge   - Consider OT consult to assist with ADL evaluation and planning for discharge  - Provide patient education as appropriate  Outcome: Progressing  Goal: Maintains/Returns to pre admission functional level  Description: INTERVENTIONS:  - Perform BMAT or MOVE assessment daily    - Set and communicate daily mobility goal to care team and patient/family/caregiver     - Collaborate with rehabilitation services on mobility goals if consulted  -   - Out of bed for toileting  - Record patient progress and toleration of activity level   Outcome: Progressing     Problem: DISCHARGE PLANNING  Goal: Discharge to home or other facility with appropriate resources  Description: INTERVENTIONS:  - Identify barriers to discharge w/patient and caregiver  - Arrange for needed discharge resources and transportation as appropriate  - Identify discharge learning needs (meds, wound care, etc )  - Arrange for interpretive services to assist at discharge as needed  - Refer to Case Management Department for coordinating discharge planning if the patient needs post-hospital services based on physician/advanced practitioner order or complex needs related to functional status, cognitive ability, or social support system  Outcome: Progressing

## 2022-08-10 NOTE — PLAN OF CARE
Problem: POSTPARTUM  Goal: Experiences normal postpartum course  Description: INTERVENTIONS:  - Monitor maternal vital signs  - Assess uterine involution and lochia  Outcome: Progressing  Goal: Appropriate maternal -  bonding  Description: INTERVENTIONS:  - Identify family support  - Assess for appropriate maternal/infant bonding   -Encourage maternal/infant bonding opportunities  - Referral to  or  as needed  Outcome: Progressing  Goal: Establishment of infant feeding pattern  Description: INTERVENTIONS:  - Assess breast/bottle feeding  - Refer to lactation as needed  Outcome: Progressing  Goal: Incision(s), wounds(s) or drain site(s) healing without S/S of infection  Description: INTERVENTIONS  - Assess and document dressing, incision, wound bed, drain sites and surrounding tissue  - Provide patient and family education  Outcome: Progressing     Problem: PAIN - ADULT  Goal: Verbalizes/displays adequate comfort level or baseline comfort level  Description: Interventions:  - Encourage patient to monitor pain and request assistance  - Assess pain using appropriate pain scale  - Administer analgesics based on type and severity of pain and evaluate response  - Implement non-pharmacological measures as appropriate and evaluate response  - Consider cultural and social influences on pain and pain management  - Notify physician/advanced practitioner if interventions unsuccessful or patient reports new pain  Outcome: Progressing     Problem: INFECTION - ADULT  Goal: Absence or prevention of progression during hospitalization  Description: INTERVENTIONS:  - Assess and monitor for signs and symptoms of infection  - Monitor lab/diagnostic results  - Monitor all insertion sites, i e  indwelling lines, tubes, and drains  - Monitor endotracheal if appropriate and nasal secretions for changes in amount and color  - [place] appropriate cooling/warming therapies per order  - Administer medications as ordered  - Instruct and encourage patient and family to use good hand hygiene technique  - Identify and instruct in appropriate isolation precautions for identified infection/condition  Outcome: Progressing  Goal: Absence of fever/infection during neutropenic period  Description: INTERVENTIONS:  - Monitor WBC    Outcome: Progressing     Problem: SAFETY ADULT  Goal: Patient will remain free of falls  Description: INTERVENTIONS:  - Educate patient/family on patient safety including physical limitations  - Instruct patient to call for assistance with activity   - Consult OT/PT to assist with strengthening/mobility   - Keep Call bell within reach  - Keep bed low and locked with side rails adjusted as appropriate  - Keep care items and personal belongings within reach  - Initiate and maintain comfort rounds  - Make Fall Risk Sign visible to staff  - Apply yellow socks and bracelet for high fall risk patients  - Consider moving patient to room near nurses station  Outcome: Progressing  Goal: Maintain or return to baseline ADL function  Description: INTERVENTIONS:  -  Assess patient's ability to carry out ADLs; assess patient's baseline for ADL function and identify physical deficits which impact ability to perform ADLs (bathing, care of mouth/teeth, toileting, grooming, dressing, etc )  - Assess/evaluate cause of self-care deficits   - Assess range of motion  - Assess patient's mobility; develop plan if impaired  - Assess patient's need for assistive devices and provide as appropriate  - Encourage maximum independence but intervene and supervise when necessary  - Involve family in performance of ADLs  - Assess for home care needs following discharge   - Consider OT consult to assist with ADL evaluation and planning for discharge  - Provide patient education as appropriate  Outcome: Progressing  Goal: Maintains/Returns to pre admission functional level  Description: INTERVENTIONS:  - Perform BMAT or MOVE assessment daily    - Set and communicate daily mobility goal to care team and patient/family/caregiver     - Collaborate with rehabilitation services on mobility goals if consulted  - Out of bed for toileting  - Record patient progress and toleration of activity level   Outcome: Progressing     Problem: DISCHARGE PLANNING  Goal: Discharge to home or other facility with appropriate resources  Description: INTERVENTIONS:  - Identify barriers to discharge w/patient and caregiver  - Arrange for needed discharge resources and transportation as appropriate  - Identify discharge learning needs (meds, wound care, etc )  - Arrange for interpretive services to assist at discharge as needed  - Refer to Case Management Department for coordinating discharge planning if the patient needs post-hospital services based on physician/advanced practitioner order or complex needs related to functional status, cognitive ability, or social support system  Outcome: Progressing

## 2022-08-10 NOTE — LACTATION NOTE
This note was copied from a baby's chart  CONSULT - LACTATION  Baby Boy Hardy Saver) [address] [place] 1 days male MRN: [id number]    [facility] NURSERY Room / Bed: (N)/(N) Encounter: [id number]    Maternal Information     MOTHER:  Javad Oswald  Maternal Age: 32 y o    OB History: # 1 - Date: None, Sex: None, Weight: None, GA: None, Delivery: None, Apgar1: None, Apgar5: None, Living: None, Birth Comments: None    # 2 - Date: None, Sex: None, Weight: None, GA: None, Delivery: None, Apgar1: None, Apgar5: None, Living: None, Birth Comments: None   Previouse breast reduction surgery? No    Lactation history:   Has patient previously breast fed: No   How long had patient previously breast fed:     Previous breast feeding complications:       Past Surgical History:   Procedure Laterality Date    WISDOM TOOTH EXTRACTION          Birth information:  YOB: 2022   Time of birth: 11:49 PM   Sex: male   Delivery type: , Low Transverse   Birth Weight: 4077 g (8 lb 15 8 oz)   Percent of Weight Change: 0%     Gestational Age: 45w2d   [unfilled]    Assessment     Breast and nipple assessment: normal assessment    [place] Assessment: normal assessment    Feeding assessment: feeding well  LATCH:  Latch: Grasps breast, tongue down, lips flanged, rhythmic sucking   Audible Swallowing: Spontaneous and intermittent (24 hours old)   Type of Nipple: Everted (After stimulation)   Comfort (Breast/Nipple): Soft/non-tender   Hold (Positioning): Partial assist, teach one side, mother does other, staff holds   LATCH Score: 9          Feeding recommendations:  breast feed on demand     Met with mother  Provided mother with Ready, Set, Baby booklet  Discussed Skin to Skin contact an benefits to mom and baby  Talked about the delay of the first bath until baby has adjusted  Spoke about the benefits of rooming in  Feeding on cue and what that means for recognizing infant's hunger  Avoidance of pacifiers for the first month discussed  Talked about exclusive breastfeeding for the first 6 months  Positioning and latch reviewed as well as showing images of other feeding positions  Discussed the properties of a good latch in any position  Reviewed hand/manual expression  Discussed s/s that baby is getting enough milk and some s/s that breastfeeding dyad may need further help  Gave information on common concerns, what to expect the first few weeks after delivery, preparing for other caregivers, and how partners can help  Resources for support also provided  Information on hand expression given  Discussed benefits of knowing how to manually express breast including stimulating milk supply, softening nipple for latch and evacuating breast in the event of engorgement  Discussed 2nd night syndrome and ways to calm infant  Hand out given  Provided DC booklet at this time, enc family to review and prepare questions for day of DC  Assisted Mom to place baby skin to skin in football hold  Discussed importance of alignment of baby's ear, shoulder, and hip in any preferred position  Worked on supporting baby at breast level and beginning the feed with baby's nose arriving at the nipple  Then, using areolar compression while guiding baby chin-forward to the breast to achieve a deep, comfortable latch  Mom expressed concerns about baby's nose being blocked, education provided on positioning tips to tilt baby's head back to drive chin forward and allow for better visualization of the nose  Baby latches well at this time  Reviewed signs of effective breastfeeding: audible swallows, strong but comfortable tugging while latched, breasts softening (after milk comes in), baby falling asleep and releasing the breast, and meeting daily diaper goals  Mom's breast pump has been delivered to the bedside           Sruthi Mcrae RN 8/10/2022 2:41 PM

## 2022-08-10 NOTE — CASE MANAGEMENT
Case Management Progress Note    Patient name Elvia Chopra  Location /-95 MRN [id number]  : 1995 Date 8/10/2022       LOS (days): 2  Geometric Mean LOS (GMLOS) (days):   Days to GMLOS:        OBJECTIVE:        Current admission status: Inpatient  Preferred Pharmacy:   [address] [address]  [address] [place] [address]  Phone: [phone] Fax: [phone]    Primary Care Provider: PHILIPPE Russo    Primary Insurance: Devra Goldmann  Secondary Insurance: [facility]    PROGRESS NOTE:    Consult: "Hx THC use in last two years"     Per review of chart, MOB UDS results were negative on admission  Infant UDS results negative  Cord blood sent  MOB reported no use of THC use during pregnancy per review of records  No additional SW concerns noted

## 2022-08-10 NOTE — PLAN OF CARE
Problem: PAIN - ADULT  Goal: Verbalizes/displays adequate comfort level or baseline comfort level  Description: Interventions:  - Encourage patient to monitor pain and request assistance  - Assess pain using appropriate pain scale  - Administer analgesics based on type and severity of pain and evaluate response  - Implement non-pharmacological measures as appropriate and evaluate response  - Consider cultural and social influences on pain and pain management  - Notify physician/advanced practitioner if interventions unsuccessful or patient reports new pain  Outcome: Progressing     Problem: INFECTION - ADULT  Goal: Absence or prevention of progression during hospitalization  Description: INTERVENTIONS:  - Assess and monitor for signs and symptoms of infection  - Monitor lab/diagnostic results  - Monitor all insertion sites, i e  indwelling lines, tubes, and drains  - Monitor endotracheal if appropriate and nasal secretions for changes in amount and color  - [place] appropriate cooling/warming therapies per order  - Administer medications as ordered  - Instruct and encourage patient and family to use good hand hygiene technique  - Identify and instruct in appropriate isolation precautions for identified infection/condition  Outcome: Progressing  Goal: Absence of fever/infection during neutropenic period  Description: INTERVENTIONS:  - Monitor WBC    Outcome: Progressing     Problem: SAFETY ADULT  Goal: Patient will remain free of falls  Description: INTERVENTIONS:  - Educate patient/family on patient safety including physical limitations  - Instruct patient to call for assistance with activity   - Consult OT/PT to assist with strengthening/mobility   - Keep Call bell within reach  - Keep bed low and locked with side rails adjusted as appropriate  - Keep care items and personal belongings within reach  - Initiate and maintain comfort rounds  - Make Fall Risk Sign visible to staff  - Offer Toileting every  Hours, in advance of need  - Initiate/Maintain alarm  - Obtain necessary fall risk management equipment:   - Apply yellow socks and bracelet for high fall risk patients  - Consider moving patient to room near nurses station  Outcome: Progressing  Goal: Maintain or return to baseline ADL function  Description: INTERVENTIONS:  -  Assess patient's ability to carry out ADLs; assess patient's baseline for ADL function and identify physical deficits which impact ability to perform ADLs (bathing, care of mouth/teeth, toileting, grooming, dressing, etc )  - Assess/evaluate cause of self-care deficits   - Assess range of motion  - Assess patient's mobility; develop plan if impaired  - Assess patient's need for assistive devices and provide as appropriate  - Encourage maximum independence but intervene and supervise when necessary  - Involve family in performance of ADLs  - Assess for home care needs following discharge   - Consider OT consult to assist with ADL evaluation and planning for discharge  - Provide patient education as appropriate  Outcome: Progressing  Goal: Maintains/Returns to pre admission functional level  Description: INTERVENTIONS:  - Perform BMAT or MOVE assessment daily    - Set and communicate daily mobility goal to care team and patient/family/caregiver  - Collaborate with rehabilitation services on mobility goals if consulted  - Perform Range of Motion  times a day  - Reposition patient every  hours    - Dangle patient  times a day  - Stand patient  times a day  - Ambulate patient  times a day  - Out of bed to chair  times a day   - Out of bed for meals times a day  - Out of bed for toileting  - Record patient progress and toleration of activity level   Outcome: Progressing     Problem: DISCHARGE PLANNING  Goal: Discharge to home or other facility with appropriate resources  Description: INTERVENTIONS:  - Identify barriers to discharge w/patient and caregiver  - Arrange for needed discharge resources and transportation as appropriate  - Identify discharge learning needs (meds, wound care, etc )  - Arrange for interpretive services to assist at discharge as needed  - Refer to Case Management Department for coordinating discharge planning if the patient needs post-hospital services based on physician/advanced practitioner order or complex needs related to functional status, cognitive ability, or social support system  Outcome: Progressing     Problem: POSTPARTUM  Goal: Experiences normal postpartum course  Description: INTERVENTIONS:  - Monitor maternal vital signs  - Assess uterine involution and lochia  Outcome: Progressing  Goal: Appropriate maternal -  bonding  Description: INTERVENTIONS:  - Identify family support  - Assess for appropriate maternal/infant bonding   -Encourage maternal/infant bonding opportunities  - Referral to  or  as needed  Outcome: Progressing  Goal: Establishment of infant feeding pattern  Description: INTERVENTIONS:  - Assess breast/bottle feeding  - Refer to lactation as needed  Outcome: Progressing  Goal: Incision(s), wounds(s) or drain site(s) healing without S/S of infection  Description: INTERVENTIONS  - Assess and document dressing, incision, wound bed, drain sites and surrounding tissue  - Provide patient and family education  - Perform skin care/dressing changes every   Outcome: Progressing

## 2022-08-10 NOTE — ADDENDUM NOTE
Addendum  created 08/10/22 1530 by Ave Tesfaye CRNA    Order list changed, Pharmacy for encounter modified

## 2022-08-10 NOTE — OB LABOR/OXYTOCIN SAFETY PROGRESS
Labor Progress Note - Ancel Hands 32 y o  female MRN: [id number]    Unit/Bed#: -01 Encounter: [id number]    Dose (cristina-units/min) Oxytocin: 0 cristina-units/min  Contraction Frequency (minutes): 2-3  Contraction Quality: Strong  Tachysystole: No   Cervical Dilation: 10  Dilation Complete Date: 22  Dilation Complete Time:   Cervical Effacement: 100  Fetal Station: 1  Baseline Rate: 125 bpm  Fetal Heart Rate: 120 BPM  FHR Category: Category I  Oxytocin Safety Progress Check: Safety check completed     Vital Signs:   Vitals:    22   BP: 134/87   Pulse: 103   Resp:    Temp:    SpO2:        Notes/comments:   Patient very uncomfortable with pushing- has made some minimal progress from 0 to +1 station and at this point strongly desires  section and no longer wants to push  We reviewed the risk of  section including but not limited to more challenging and painful recovery, bleeding, infection, injury to surrounding bladder, bowel, ureters, nerves and vessels of the pelvis, wound healing challenges and VTE  I reviewed her alternatives to keep pushing and at this point and that I could not offer instrumental delivery- which I would not offer in any regard secondary for concern of possible fetal macrosomia  Patient reports understanding and desires to proceed with  section for maternal request  Anesthesia and charge nurse made aware      Gatito Simon MD 2022 10:48 PM

## 2022-08-11 PROCEDURE — 99024 POSTOP FOLLOW-UP VISIT: CPT | Performed by: STUDENT IN AN ORGANIZED HEALTH CARE EDUCATION/TRAINING PROGRAM

## 2022-08-11 RX ORDER — SIMETHICONE 80 MG
80 TABLET,CHEWABLE ORAL EVERY 6 HOURS PRN
Status: DISCONTINUED | OUTPATIENT
Start: 2022-08-11 | End: 2022-08-11 | Stop reason: SDUPTHER

## 2022-08-11 RX ORDER — OXYCODONE HYDROCHLORIDE 5 MG/1
5 TABLET ORAL EVERY 4 HOURS PRN
Status: DISCONTINUED | OUTPATIENT
Start: 2022-08-11 | End: 2022-08-12 | Stop reason: HOSPADM

## 2022-08-11 RX ORDER — ACETAMINOPHEN 325 MG/1
650 TABLET ORAL EVERY 6 HOURS PRN
Status: DISCONTINUED | OUTPATIENT
Start: 2022-08-11 | End: 2022-08-12 | Stop reason: HOSPADM

## 2022-08-11 RX ORDER — ONDANSETRON 2 MG/ML
4 INJECTION INTRAMUSCULAR; INTRAVENOUS EVERY 8 HOURS PRN
Status: DISCONTINUED | OUTPATIENT
Start: 2022-08-11 | End: 2022-08-12 | Stop reason: HOSPADM

## 2022-08-11 RX ORDER — OXYCODONE HYDROCHLORIDE 10 MG/1
10 TABLET ORAL EVERY 4 HOURS PRN
Status: DISCONTINUED | OUTPATIENT
Start: 2022-08-11 | End: 2022-08-12 | Stop reason: HOSPADM

## 2022-08-11 RX ORDER — DIPHENHYDRAMINE HCL 25 MG
25 TABLET ORAL EVERY 6 HOURS PRN
Status: DISCONTINUED | OUTPATIENT
Start: 2022-08-11 | End: 2022-08-12 | Stop reason: HOSPADM

## 2022-08-11 RX ORDER — ENOXAPARIN SODIUM 100 MG/ML
40 INJECTION SUBCUTANEOUS EVERY 12 HOURS SCHEDULED
Status: DISCONTINUED | OUTPATIENT
Start: 2022-08-11 | End: 2022-08-12 | Stop reason: HOSPADM

## 2022-08-11 RX ORDER — IBUPROFEN 600 MG/1
600 TABLET ORAL EVERY 6 HOURS PRN
Status: DISCONTINUED | OUTPATIENT
Start: 2022-08-11 | End: 2022-08-12 | Stop reason: HOSPADM

## 2022-08-11 RX ORDER — SIMETHICONE 80 MG
80 TABLET,CHEWABLE ORAL 4 TIMES DAILY PRN
Status: DISCONTINUED | OUTPATIENT
Start: 2022-08-11 | End: 2022-08-12 | Stop reason: HOSPADM

## 2022-08-11 RX ADMIN — DOCUSATE SODIUM 100 MG: 100 CAPSULE, LIQUID FILLED ORAL at 17:24

## 2022-08-11 RX ADMIN — IBUPROFEN 400 MG: 400 TABLET, FILM COATED ORAL at 09:15

## 2022-08-11 RX ADMIN — ENOXAPARIN SODIUM 40 MG: 40 INJECTION SUBCUTANEOUS at 17:24

## 2022-08-11 RX ADMIN — ACETAMINOPHEN 650 MG: 325 TABLET ORAL at 12:45

## 2022-08-11 RX ADMIN — OXYCODONE HYDROCHLORIDE 10 MG: 5 TABLET ORAL at 01:51

## 2022-08-11 RX ADMIN — SIMETHICONE 80 MG: 80 TABLET, CHEWABLE ORAL at 12:45

## 2022-08-11 RX ADMIN — DOCUSATE SODIUM 100 MG: 100 CAPSULE, LIQUID FILLED ORAL at 09:15

## 2022-08-11 RX ADMIN — IBUPROFEN 600 MG: 600 TABLET ORAL at 21:08

## 2022-08-11 RX ADMIN — IBUPROFEN 400 MG: 400 TABLET, FILM COATED ORAL at 14:58

## 2022-08-11 RX ADMIN — VALACYCLOVIR HYDROCHLORIDE 1000 MG: 500 TABLET, FILM COATED ORAL at 09:15

## 2022-08-11 RX ADMIN — OXYCODONE HYDROCHLORIDE 10 MG: 5 TABLET ORAL at 07:46

## 2022-08-11 RX ADMIN — ACETAMINOPHEN 650 MG: 325 TABLET ORAL at 17:24

## 2022-08-11 RX ADMIN — ENOXAPARIN SODIUM 40 MG: 40 INJECTION SUBCUTANEOUS at 06:04

## 2022-08-11 NOTE — PROGRESS NOTES
Progress Note - OB/GYN  Lavon Lines 32 y o  female MRN: [id number]  Unit/Bed#: -01 Encounter: [id number]    Assessment and [address]  is a patient of: [facility]   She is POD# 2 s/p 1LTCS  Recovering well and is stable            Status post primary low transverse  section  Assessment & Plan  QBL 1004, Hgb 12 5 --post-op Hgb 10 7  Hunt removed, passed void trial  Pain: Tylenol and toradol scheduled, rebecca 5/10 PRN    FEN: Tolerating regular diet  DVT ppx: SCDs and  Lovenox 40mg qD  Passing flatus   Incision covered with mepilex dressing  Encourage ambulation    Gestational hypertension  Assessment & Plan  S/p Labetalol given on 08/10/2022  CBC, CMP P:C ( 1 71) wnl    Systolic (15XLL), WJM:646 , Min:95 , CDE:948   Diastolic (26RJN), XTV:76, Min:51, Max:58      Genital herpes  Assessment & Plan  Continue suppressive therapy  Valtrex 1000mg daily  Disposition    - Anticipate discharge home on POD# 3-4      Subjective/Objective     Chief Complaint: Postpartum State     Subjective:    Lavon Lines is PPD/POD#1 s/p 1LTCS  She is currently complaining of abdominal pain at the sight of the incision  Abdominal pain is relieved by the pain medication  Patient is currently voiding  She is not ambulating and laying flat supine on the bed  Patient is currently passing flatus and has had no bowel movement  She is tolerating PO, and denies nausea or vomitting  Patient denies fever, chills, chest pain, shortness of breath, or calf tenderness  Lochia is normal  She is  Breastfeeding  She is recovering well and is stable         Vitals:   BP 95/55 (BP Location: Right arm)   Pulse 100   Temp 97 6 °F (36 4 °C) (Oral)   Resp 20   Ht 5' 6" (1 676 m)   Wt (!) 156 kg (345 lb)   LMP 10/23/2021   SpO2 96%   Breastfeeding Yes   BMI 55 68 kg/m²       Intake/Output Summary (Last 24 hours) at 2022 0710  Last data filed at 8/10/2022 2056  Gross per 24 hour   Intake --   Output 1575 ml   Net -1575 ml       Invasive Devices  Timeline    None                 Physical Exam:   GEN: Justina Sharpe appears well, alert and oriented x 3, pleasant and cooperative   CARDIO: RRR, no murmurs or rubs  RESP:  CTAB, no wheezes or rales  ABDOMEN: soft, no tenderness, no distention, fundus @ 2 cm below U, Incision dressing is Mepilex, to be removed 1 week PP  EXTREMITIES: SCDs off,  non tender, no erythema      Labs:     Hemoglobin   Date Value Ref Range Status   08/10/2022 10 7 (L) 11 5 - 15 4 g/dL Final   08/08/2022 12 5 11 5 - 15 4 g/dL Final     WBC   Date Value Ref Range Status   08/10/2022 18 35 (H) 4 31 - 10 16 Thousand/uL Final   08/08/2022 11 49 (H) 4 31 - 10 16 Thousand/uL Final     Platelets   Date Value Ref Range Status   08/10/2022 239 149 - 390 Thousands/uL Final   08/08/2022 257 149 - 390 Thousands/uL Final     Creatinine   Date Value Ref Range Status   08/10/2022 0 44 (L) 0 60 - 1 30 mg/dL Final     Comment:     Standardized to IDMS reference method   08/10/2022 0 44 (L) 0 60 - 1 30 mg/dL Final     Comment:     Standardized to IDMS reference method     AST   Date Value Ref Range Status   08/10/2022 25 13 - 39 U/L Final     Comment:     Specimen collection should occur prior to Sulfasalazine administration due to the potential for falsely depressed results  08/10/2022 25 13 - 39 U/L Final     Comment:     Specimen collection should occur prior to Sulfasalazine administration due to the potential for falsely depressed results  ALT   Date Value Ref Range Status   08/10/2022 17 7 - 52 U/L Final     Comment:     Specimen collection should occur prior to Sulfasalazine administration due to the potential for falsely depressed results  08/10/2022 20 7 - 52 U/L Final     Comment:     Specimen collection should occur prior to Sulfasalazine administration due to the potential for falsely depressed results             Janel Kunz MD  8/11/2022  7:10 AM

## 2022-08-11 NOTE — PLAN OF CARE
Problem: PAIN - ADULT  Goal: Verbalizes/displays adequate comfort level or baseline comfort level  Description: Interventions:  - Encourage patient to monitor pain and request assistance  - Assess pain using appropriate pain scale  - Administer analgesics based on type and severity of pain and evaluate response  - Implement non-pharmacological measures as appropriate and evaluate response  - Consider cultural and social influences on pain and pain management  - Notify physician/advanced practitioner if interventions unsuccessful or patient reports new pain  Outcome: Progressing     Problem: INFECTION - ADULT  Goal: Absence or prevention of progression during hospitalization  Description: INTERVENTIONS:  - Assess and monitor for signs and symptoms of infection  - Monitor lab/diagnostic results  - Monitor all insertion sites, i e  indwelling lines, tubes, and drains  - Monitor endotracheal if appropriate and nasal secretions for changes in amount and color  - [place] appropriate cooling/warming therapies per order  - Administer medications as ordered  - Instruct and encourage patient and family to use good hand hygiene technique  - Identify and instruct in appropriate isolation precautions for identified infection/condition  Outcome: Progressing  Goal: Absence of fever/infection during neutropenic period  Description: INTERVENTIONS:  - Monitor WBC    Outcome: Progressing     Problem: SAFETY ADULT  Goal: Patient will remain free of falls  Description: INTERVENTIONS:  - Educate patient/family on patient safety including physical limitations  - Instruct patient to call for assistance with activity   - Consult OT/PT to assist with strengthening/mobility   - Keep Call bell within reach  - Keep bed low and locked with side rails adjusted as appropriate  - Keep care items and personal belongings within reach  - Initiate and maintain comfort rounds  - Make Fall Risk Sign visible to staff  - Apply yellow socks and bracelet for high fall risk patients  - Consider moving patient to room near nurses station  Outcome: Progressing  Goal: Maintain or return to baseline ADL function  Description: INTERVENTIONS:  -  Assess patient's ability to carry out ADLs; assess patient's baseline for ADL function and identify physical deficits which impact ability to perform ADLs (bathing, care of mouth/teeth, toileting, grooming, dressing, etc )  - Assess/evaluate cause of self-care deficits   - Assess range of motion  - Assess patient's mobility; develop plan if impaired  - Assess patient's need for assistive devices and provide as appropriate  - Encourage maximum independence but intervene and supervise when necessary  - Involve family in performance of ADLs  - Assess for home care needs following discharge   - Consider OT consult to assist with ADL evaluation and planning for discharge  - Provide patient education as appropriate  Outcome: Progressing     Problem: DISCHARGE PLANNING  Goal: Discharge to home or other facility with appropriate resources  Description: INTERVENTIONS:  - Identify barriers to discharge w/patient and caregiver  - Arrange for needed discharge resources and transportation as appropriate  - Identify discharge learning needs (meds, wound care, etc )  - Arrange for interpretive services to assist at discharge as needed  - Refer to Case Management Department for coordinating discharge planning if the patient needs post-hospital services based on physician/advanced practitioner order or complex needs related to functional status, cognitive ability, or social support system  Outcome: Progressing     Problem: POSTPARTUM  Goal: Experiences normal postpartum course  Description: INTERVENTIONS:  - Monitor maternal vital signs  - Assess uterine involution and lochia  Outcome: Progressing  Goal: Appropriate maternal -  bonding  Description: INTERVENTIONS:  - Identify family support  - Assess for appropriate maternal/infant bonding -Encourage maternal/infant bonding opportunities  - Referral to  or  as needed  Outcome: Progressing  Goal: Establishment of infant feeding pattern  Description: INTERVENTIONS:  - Assess breast/bottle feeding  - Refer to lactation as needed  Outcome: Progressing  Goal: Incision(s), wounds(s) or drain site(s) healing without S/S of infection  Description: INTERVENTIONS  - Assess and document dressing, incision, wound bed, drain sites and surrounding tissue  - Provide patient and family education  Outcome: Progressing

## 2022-08-11 NOTE — PLAN OF CARE
Problem: PAIN - ADULT  Goal: Verbalizes/displays adequate comfort level or baseline comfort level  Description: Interventions:  - Encourage patient to monitor pain and request assistance  - Assess pain using appropriate pain scale  - Administer analgesics based on type and severity of pain and evaluate response  - Implement non-pharmacological measures as appropriate and evaluate response  - Consider cultural and social influences on pain and pain management  - Notify physician/advanced practitioner if interventions unsuccessful or patient reports new pain  Outcome: Progressing     Problem: INFECTION - ADULT  Goal: Absence or prevention of progression during hospitalization  Description: INTERVENTIONS:  - Assess and monitor for signs and symptoms of infection  - Monitor lab/diagnostic results  - Monitor all insertion sites, i e  indwelling lines, tubes, and drains  - Monitor endotracheal if appropriate and nasal secretions for changes in amount and color  - [place] appropriate cooling/warming therapies per order  - Administer medications as ordered  - Instruct and encourage patient and family to use good hand hygiene technique  - Identify and instruct in appropriate isolation precautions for identified infection/condition  Outcome: Progressing  Goal: Absence of fever/infection during neutropenic period  Description: INTERVENTIONS:  - Monitor WBC    Outcome: Progressing     Problem: SAFETY ADULT  Goal: Patient will remain free of falls  Description: INTERVENTIONS:  - Educate patient/family on patient safety including physical limitations  - Instruct patient to call for assistance with activity   - Consult OT/PT to assist with strengthening/mobility   - Keep Call bell within reach  - Keep bed low and locked with side rails adjusted as appropriate  - Keep care items and personal belongings within reach  - Initiate and maintain comfort rounds  - Make Fall Risk Sign visible to staff  - Offer Toileting every  Hours, in advance of need  - Initiate/Maintain alarm  - Obtain necessary fall risk management equipment:   - Apply yellow socks and bracelet for high fall risk patients  - Consider moving patient to room near nurses station  Outcome: Progressing  Goal: Maintain or return to baseline ADL function  Description: INTERVENTIONS:  -  Assess patient's ability to carry out ADLs; assess patient's baseline for ADL function and identify physical deficits which impact ability to perform ADLs (bathing, care of mouth/teeth, toileting, grooming, dressing, etc )  - Assess/evaluate cause of self-care deficits   - Assess range of motion  - Assess patient's mobility; develop plan if impaired  - Assess patient's need for assistive devices and provide as appropriate  - Encourage maximum independence but intervene and supervise when necessary  - Involve family in performance of ADLs  - Assess for home care needs following discharge   - Consider OT consult to assist with ADL evaluation and planning for discharge  - Provide patient education as appropriate  Outcome: Progressing  Goal: Maintains/Returns to pre admission functional level  Description: INTERVENTIONS:  - Perform BMAT or MOVE assessment daily    - Set and communicate daily mobility goal to care team and patient/family/caregiver  - Collaborate with rehabilitation services on mobility goals if consulted  - Perform Range of Motion  times a day  - Reposition patient every  hours    - Dangle patient  times a day  - Stand patient  times a day  - Ambulate patient times a day  - Out of bed to chair  times a day   - Out of bed for meals  times a day  - Out of bed for toileting  - Record patient progress and toleration of activity level   Outcome: Progressing     Problem: DISCHARGE PLANNING  Goal: Discharge to home or other facility with appropriate resources  Description: INTERVENTIONS:  - Identify barriers to discharge w/patient and caregiver  - Arrange for needed discharge resources and transportation as appropriate  - Identify discharge learning needs (meds, wound care, etc )  - Arrange for interpretive services to assist at discharge as needed  - Refer to Case Management Department for coordinating discharge planning if the patient needs post-hospital services based on physician/advanced practitioner order or complex needs related to functional status, cognitive ability, or social support system  Outcome: Progressing     Problem: POSTPARTUM  Goal: Experiences normal postpartum course  Description: INTERVENTIONS:  - Monitor maternal vital signs  - Assess uterine involution and lochia  Outcome: Progressing  Goal: Appropriate maternal -  bonding  Description: INTERVENTIONS:  - Identify family support  - Assess for appropriate maternal/infant bonding   -Encourage maternal/infant bonding opportunities  - Referral to  or  as needed  Outcome: Progressing  Goal: Establishment of infant feeding pattern  Description: INTERVENTIONS:  - Assess breast/bottle feeding  - Refer to lactation as needed  Outcome: Progressing  Goal: Incision(s), wounds(s) or drain site(s) healing without S/S of infection  Description: INTERVENTIONS  - Assess and document dressing, incision, wound bed, drain sites and surrounding tissue  - Provide patient and family education  - Perform skin care/dressing changes every   Outcome: Progressing

## 2022-08-12 VITALS
OXYGEN SATURATION: 98 % | TEMPERATURE: 97.8 F | HEART RATE: 89 BPM | BODY MASS INDEX: 47.09 KG/M2 | HEIGHT: 66 IN | RESPIRATION RATE: 18 BRPM | SYSTOLIC BLOOD PRESSURE: 138 MMHG | DIASTOLIC BLOOD PRESSURE: 59 MMHG | WEIGHT: 293 LBS

## 2022-08-12 PROCEDURE — 99024 POSTOP FOLLOW-UP VISIT: CPT | Performed by: OBSTETRICS & GYNECOLOGY

## 2022-08-12 RX ORDER — IBUPROFEN 600 MG/1
600 TABLET ORAL EVERY 6 HOURS PRN
Qty: 45 TABLET | Refills: 0 | Status: SHIPPED | OUTPATIENT
Start: 2022-08-12

## 2022-08-12 RX ORDER — OXYCODONE HYDROCHLORIDE 5 MG/1
5 TABLET ORAL EVERY 6 HOURS PRN
Qty: 18 TABLET | Refills: 0 | Status: SHIPPED | OUTPATIENT
Start: 2022-08-12 | End: 2022-08-22

## 2022-08-12 RX ORDER — SIMETHICONE 80 MG
80 TABLET,CHEWABLE ORAL 4 TIMES DAILY PRN
Qty: 30 TABLET | Refills: 0
Start: 2022-08-12

## 2022-08-12 RX ORDER — ACETAMINOPHEN 325 MG/1
650 TABLET ORAL EVERY 6 HOURS PRN
Refills: 0
Start: 2022-08-12

## 2022-08-12 RX ADMIN — IBUPROFEN 600 MG: 600 TABLET ORAL at 08:59

## 2022-08-12 RX ADMIN — ACETAMINOPHEN 650 MG: 325 TABLET ORAL at 06:07

## 2022-08-12 RX ADMIN — VALACYCLOVIR HYDROCHLORIDE 1000 MG: 500 TABLET, FILM COATED ORAL at 08:22

## 2022-08-12 RX ADMIN — OXYCODONE HYDROCHLORIDE 10 MG: 10 TABLET ORAL at 00:44

## 2022-08-12 RX ADMIN — ENOXAPARIN SODIUM 40 MG: 40 INJECTION SUBCUTANEOUS at 06:07

## 2022-08-12 RX ADMIN — OXYCODONE HYDROCHLORIDE 5 MG: 5 TABLET ORAL at 14:29

## 2022-08-12 RX ADMIN — ACETAMINOPHEN 650 MG: 325 TABLET ORAL at 12:49

## 2022-08-12 RX ADMIN — DOCUSATE SODIUM 100 MG: 100 CAPSULE, LIQUID FILLED ORAL at 08:22

## 2022-08-12 RX ADMIN — SIMETHICONE 80 MG: 80 TABLET, CHEWABLE ORAL at 08:22

## 2022-08-12 RX ADMIN — ACETAMINOPHEN 650 MG: 325 TABLET ORAL at 00:40

## 2022-08-12 RX ADMIN — IBUPROFEN 600 MG: 600 TABLET ORAL at 03:26

## 2022-08-12 NOTE — PLAN OF CARE
Problem: PAIN - ADULT  Goal: Verbalizes/displays adequate comfort level or baseline comfort level  Description: Interventions:  - Encourage patient to monitor pain and request assistance  - Assess pain using appropriate pain scale  - Administer analgesics based on type and severity of pain and evaluate response  - Implement non-pharmacological measures as appropriate and evaluate response  - Consider cultural and social influences on pain and pain management  - Notify physician/advanced practitioner if interventions unsuccessful or patient reports new pain  8/11/2022 2046 by Trae Hu RN  Outcome: Progressing  8/11/2022 2046 by Trae Hu RN  Outcome: Progressing     Problem: INFECTION - ADULT  Goal: Absence or prevention of progression during hospitalization  Description: INTERVENTIONS:  - Assess and monitor for signs and symptoms of infection  - Monitor lab/diagnostic results  - Monitor all insertion sites, i e  indwelling lines, tubes, and drains  - Monitor endotracheal if appropriate and nasal secretions for changes in amount and color  - [place] appropriate cooling/warming therapies per order  - Administer medications as ordered  - Instruct and encourage patient and family to use good hand hygiene technique  - Identify and instruct in appropriate isolation precautions for identified infection/condition  8/11/2022 2046 by Trae Hu RN  Outcome: Progressing  8/11/2022 2046 by Trae Hu RN  Outcome: Progressing  Goal: Absence of fever/infection during neutropenic period  Description: INTERVENTIONS:  - Monitor WBC    8/11/2022 2046 by Trae Hu RN  Outcome: Progressing  8/11/2022 2046 by Trae Hu RN  Outcome: Progressing     Problem: SAFETY ADULT  Goal: Patient will remain free of falls  Description: INTERVENTIONS:  - Educate patient/family on patient safety including physical limitations  - Instruct patient to call for assistance with activity   - Consult OT/PT to assist with strengthening/mobility   - Keep Call bell within reach  - Keep bed low and locked with side rails adjusted as appropriate  - Keep care items and personal belongings within reach  - Initiate and maintain comfort rounds  - Make Fall Risk Sign visible to staff  - Offer Toileting every  Hours, in advance of need  - Initiate/Maintain alarm  - Obtain necessary fall risk management equipment:   - Apply yellow socks and bracelet for high fall risk patients  - Consider moving patient to room near nurses station  8/11/2022 2046 by Diana Bob RN  Outcome: Progressing  8/11/2022 2046 by Diana Bob RN  Outcome: Progressing  Goal: Maintain or return to baseline ADL function  Description: INTERVENTIONS:  -  Assess patient's ability to carry out ADLs; assess patient's baseline for ADL function and identify physical deficits which impact ability to perform ADLs (bathing, care of mouth/teeth, toileting, grooming, dressing, etc )  - Assess/evaluate cause of self-care deficits   - Assess range of motion  - Assess patient's mobility; develop plan if impaired  - Assess patient's need for assistive devices and provide as appropriate  - Encourage maximum independence but intervene and supervise when necessary  - Involve family in performance of ADLs  - Assess for home care needs following discharge   - Consider OT consult to assist with ADL evaluation and planning for discharge  - Provide patient education as appropriate  8/11/2022 2046 by Diana Bob RN  Outcome: Progressing  8/11/2022 2046 by Diana Bob RN  Outcome: Progressing  Goal: Maintains/Returns to pre admission functional level  Description: INTERVENTIONS:  - Perform BMAT or MOVE assessment daily    - Set and communicate daily mobility goal to care team and patient/family/caregiver  - Collaborate with rehabilitation services on mobility goals if consulted  - Perform Range of Motion  times a day  - Reposition patient every  hours    - Dangle patient  times a day  - Stand patient  times a day  - Ambulate patient  times a day  - Out of bed to chair  times a day   - Out of bed for meals  times a day  - Out of bed for toileting  - Record patient progress and toleration of activity level   2022 by Wesley Barnard RN  Outcome: Progressing  2022 by Wesley Barnard RN  Outcome: Progressing     Problem: DISCHARGE PLANNING  Goal: Discharge to home or other facility with appropriate resources  Description: INTERVENTIONS:  - Identify barriers to discharge w/patient and caregiver  - Arrange for needed discharge resources and transportation as appropriate  - Identify discharge learning needs (meds, wound care, etc )  - Arrange for interpretive services to assist at discharge as needed  - Refer to Case Management Department for coordinating discharge planning if the patient needs post-hospital services based on physician/advanced practitioner order or complex needs related to functional status, cognitive ability, or social support system  2022 by Wesley Barnard RN  Outcome: Progressing  2022 by Wesley Barnard RN  Outcome: Progressing     Problem: POSTPARTUM  Goal: Experiences normal postpartum course  Description: INTERVENTIONS:  - Monitor maternal vital signs  - Assess uterine involution and lochia  2022 by Wesley Barnard RN  Outcome: Progressing  2022 by Wesley Barnard RN  Outcome: Progressing  Goal: Appropriate maternal -  bonding  Description: INTERVENTIONS:  - Identify family support  - Assess for appropriate maternal/infant bonding   -Encourage maternal/infant bonding opportunities  - Referral to  or  as needed  2022 by Wesley Barnard RN  Outcome: Progressing  2022 by Wesley Barnard RN  Outcome: Progressing  Goal: Establishment of infant feeding pattern  Description: INTERVENTIONS:  - Assess breast/bottle feeding  - Refer to lactation as needed  2022 by Wesley Barnard RN  Outcome: Progressing  8/11/2022 2046 by Beatris Rodgers RN  Outcome: Progressing  Goal: Incision(s), wounds(s) or drain site(s) healing without S/S of infection  Description: INTERVENTIONS  - Assess and document dressing, incision, wound bed, drain sites and surrounding tissue  - Provide patient and family education  - Perform skin care/dressing changes every   8/11/2022 2046 by Beatris Rodgers RN  Outcome: Progressing  8/11/2022 2046 by Beatris Rodgers RN  Outcome: Progressing

## 2022-08-12 NOTE — PROGRESS NOTES
Progress Note - OB/GYN  Monique Gracia 32 y o  female MRN: [id number]  Unit/Bed#: -01 Encounter: [id number]    Assessment and [address] is a patient of: [facility]   She is PPD# 3 s/p 1LTCS   Recovering well and is stable       41 weeks gestation of pregnancy-resolved as of 8/10/2022  Assessment & Plan        * Status post primary low transverse  section  Assessment & Plan  QBL 1004, Hgb 12 5 --post-op Hgb 10 7  Hunt removed, passed void trial  Pain: Tylenol and toradol scheduled, rebecca 5/10 PRN    FEN: Tolerating regular diet  DVT ppx: SCDs and  Lovenox 40mg qD  Passing flatus   Incision covered with mepilex dressing  Encourage ambulation    Gestational hypertension  Assessment & Plan  Systolic (41GAI), HHT:985 , Min:86 , FSM:115   Diastolic (21TTO), QUJ:10, Min:49, Max:59    S/p Labetalol given on 08/10/2022  CBC, CMP P:C ( 0 21) wnl    Genital herpes  Assessment & Plan  Continue suppressive therapy  Valtrex 1000mg daily  Disposition    - Anticipate discharge home on POD# 3      Subjective/Objective     Chief Complaint: Postpartum State     Subjective:    Monique Gracia is PPD/POD#3 s/p 1LTCS   She has no current complaints  Pain is well controlled  Patient is currently voiding  She is ambulating  Patient is currently passing flatus and has had no bowel movement  She is tolerating PO, and denies nausea or vomitting  Patient denies fever, chills, chest pain, shortness of breath, or calf tenderness  Lochia is normal  She is  Breastfeeding  She is recovering well and is stable         Vitals:   /56   Pulse 96   Temp 98 1 °F (36 7 °C) (Oral)   Resp 20   Ht 5' 6" (1 676 m)   Wt (!) 156 kg (345 lb)   LMP 10/23/2021   SpO2 97%   Breastfeeding Yes   BMI 55 68 kg/m²     No intake or output data in the 24 hours ending 22 0645    Invasive Devices  Timeline    None                 Physical Exam:   GEN: Monique Gracia appears well, alert and oriented x 3, pleasant and cooperative   CARDIO: RRR, no murmurs or rubs  RESP:  CTAB, no wheezes or rales  ABDOMEN: soft, no tenderness, no distention, fundus @ 2 cm below U , Incision  Dressing is Mepilex, no erythema or swelling around dressing  EXTREMITIES: SCDs on, non tender, no erythema      Labs:     Hemoglobin   Date Value Ref Range Status   08/10/2022 10 7 (L) 11 5 - 15 4 g/dL Final   08/08/2022 12 5 11 5 - 15 4 g/dL Final     WBC   Date Value Ref Range Status   08/10/2022 18 35 (H) 4 31 - 10 16 Thousand/uL Final   08/08/2022 11 49 (H) 4 31 - 10 16 Thousand/uL Final     Platelets   Date Value Ref Range Status   08/10/2022 239 149 - 390 Thousands/uL Final   08/08/2022 257 149 - 390 Thousands/uL Final     Creatinine   Date Value Ref Range Status   08/10/2022 0 44 (L) 0 60 - 1 30 mg/dL Final     Comment:     Standardized to IDMS reference method   08/10/2022 0 44 (L) 0 60 - 1 30 mg/dL Final     Comment:     Standardized to IDMS reference method     AST   Date Value Ref Range Status   08/10/2022 25 13 - 39 U/L Final     Comment:     Specimen collection should occur prior to Sulfasalazine administration due to the potential for falsely depressed results  08/10/2022 25 13 - 39 U/L Final     Comment:     Specimen collection should occur prior to Sulfasalazine administration due to the potential for falsely depressed results  ALT   Date Value Ref Range Status   08/10/2022 17 7 - 52 U/L Final     Comment:     Specimen collection should occur prior to Sulfasalazine administration due to the potential for falsely depressed results  08/10/2022 20 7 - 52 U/L Final     Comment:     Specimen collection should occur prior to Sulfasalazine administration due to the potential for falsely depressed results             Mahad Dalton MD  8/12/2022  6:45 AM

## 2022-08-16 LAB — PLACENTA IN STORAGE: NORMAL

## 2022-08-17 ENCOUNTER — POSTPARTUM VISIT (OUTPATIENT)
Dept: OBGYN CLINIC | Facility: CLINIC | Age: 27
End: 2022-08-17

## 2022-08-17 VITALS
DIASTOLIC BLOOD PRESSURE: 80 MMHG | BODY MASS INDEX: 47.09 KG/M2 | SYSTOLIC BLOOD PRESSURE: 106 MMHG | HEIGHT: 66 IN | WEIGHT: 293 LBS

## 2022-08-17 PROCEDURE — 99024 POSTOP FOLLOW-UP VISIT: CPT | Performed by: NURSE PRACTITIONER

## 2022-08-19 ENCOUNTER — OFFICE VISIT (OUTPATIENT)
Dept: OBGYN CLINIC | Facility: CLINIC | Age: 27
End: 2022-08-19

## 2022-08-19 VITALS — DIASTOLIC BLOOD PRESSURE: 68 MMHG | SYSTOLIC BLOOD PRESSURE: 124 MMHG | BODY MASS INDEX: 53.91 KG/M2 | WEIGHT: 293 LBS

## 2022-08-19 DIAGNOSIS — T81.49XA INCISIONAL INFECTION: Primary | ICD-10-CM

## 2022-08-19 PROCEDURE — 99024 POSTOP FOLLOW-UP VISIT: CPT | Performed by: NURSE PRACTITIONER

## 2022-08-19 RX ORDER — CEPHALEXIN 500 MG/1
500 CAPSULE ORAL EVERY 12 HOURS SCHEDULED
Qty: 14 CAPSULE | Refills: 0 | Status: SHIPPED | OUTPATIENT
Start: 2022-08-19 | End: 2022-08-26

## 2022-08-19 NOTE — PROGRESS NOTES
Assessment/Plan:    Incisional infection  Discussed with Camelia incision and drainage looks normal at this time  Culture sent  Will start on Keflex BID x 7 days prophalaxis while awaiting culture  Monitor for s/sx of infection  Call if inicison were to open up  Call with any change in drainage, I e  puss, odor, etc    KEEP OPEN TO AIR  Can place gauze to catch drainage but do not tape down  Let open to air to allow for healing  Will call with culture results and follow up  Diagnoses and all orders for this visit:    Incisional infection  -     cephalexin (KEFLEX) 500 mg capsule; Take 1 capsule (500 mg total) by mouth every 12 (twelve) hours for 7 days  -     Culture, Aerobic Bacteria          Subjective:      Patient ID: Elvia Chopra is a 32 y o  female  Pt presents for follow up on incision  She is one week postpartum  Was seen two days ago for incision check  Was vacuuming yesterday and since then her  incision has been draining a lot  Drainage is clear/bloody looking  Notes an odor  She has been placing gauze with tape to catch drainage   looked at incision, did not notice it open at all  Denies any fevers, chills  The following portions of the patient's history were reviewed and updated as appropriate: allergies, current medications, past family history, past medical history, past social history, past surgical history and problem list     Review of Systems   Genitourinary:        Incision drainage         Objective:      /68 (BP Location: Right arm, Patient Position: Sitting)   Wt (!) 152 kg (334 lb)   LMP 10/23/2021   BMI 53 91 kg/m²          Physical Exam  Vitals and nursing note reviewed  Constitutional:       Appearance: Normal appearance  She is normal weight  Cardiovascular:      Rate and Rhythm: Normal rate and regular rhythm  Pulses: Normal pulses  Heart sounds: Normal heart sounds     Pulmonary:      Effort: Pulmonary effort is normal       Breath sounds: Normal breath sounds  Abdominal:      General: Abdomen is flat  Bowel sounds are normal       Palpations: Abdomen is soft  Comments:  incision well approximated  Drainage, clear, pink tinged drainage  No odors, erythremia, or warmth present  Culture of drainage obtained and sent to lab  Musculoskeletal:         General: Normal range of motion  Skin:     General: Skin is warm and dry  Neurological:      Mental Status: She is alert and oriented to person, place, and time  Psychiatric:         Mood and Affect: Mood normal          Behavior: Behavior normal          Thought Content:  Thought content normal          Judgment: Judgment normal

## 2022-08-19 NOTE — ASSESSMENT & PLAN NOTE
Discussed with Camelia incision and drainage looks normal at this time  Culture sent  Will start on Keflex BID x 7 days prophalaxis while awaiting culture  Monitor for s/sx of infection  Call if inicison were to open up  Call with any change in drainage, I e  puss, odor, etc    KEEP OPEN TO AIR  Can place gauze to catch drainage but do not tape down  Let open to air to allow for healing  Will call with culture results and follow up

## 2022-08-25 DIAGNOSIS — T81.49XA INCISIONAL INFECTION: Primary | ICD-10-CM

## 2022-08-25 LAB
BACTERIA SPEC AEROBE CULT: ABNORMAL
Lab: ABNORMAL
Lab: ABNORMAL
SL AMB ANTIMICROBIAL SUSCEPTIBILITY: ABNORMAL

## 2022-08-25 RX ORDER — PENICILLIN V POTASSIUM 500 MG/1
500 TABLET ORAL EVERY 8 HOURS SCHEDULED
Qty: 21 TABLET | Refills: 0 | Status: SHIPPED | OUTPATIENT
Start: 2022-08-25 | End: 2022-09-01

## 2022-09-07 ENCOUNTER — POSTPARTUM VISIT (OUTPATIENT)
Dept: OBGYN CLINIC | Facility: CLINIC | Age: 27
End: 2022-09-07

## 2022-09-07 VITALS — DIASTOLIC BLOOD PRESSURE: 70 MMHG | WEIGHT: 293 LBS | SYSTOLIC BLOOD PRESSURE: 120 MMHG | BODY MASS INDEX: 52.78 KG/M2

## 2022-09-07 PROCEDURE — 99024 POSTOP FOLLOW-UP VISIT: CPT | Performed by: OBSTETRICS & GYNECOLOGY

## 2022-09-07 NOTE — PROGRESS NOTES
Subjective     Delvin Cervantes is a 32 y o   female here  for postpartum visit  She is 3 weeks post partum following a primary  section by patient decision after failure to descend with 1 hour of pushing  I have fully reviewed the prenatal and intrapartum course  The delivery was at 39 gestational weeks  Outcome: PCS  Anesthesia: epidural   Postpartum course has been complicated by Incisional infection treated with antibiotics resolved  Baby's course has been doing well without problems  Baby is feeding breast with formula supplement by bottle  Patient is tolerating regular diet, Bleeding no bleeding  Bowel function is Improving was constipated with yeast infection after antibiotic  Bladder function is normal  Patient is not sexually active  Contraception method is Considering Nexplanon have previously without side effects but had heard concerns regarding depression anxiety  Reviewed with patient that if she had done well previously I would not expect an increase in depression or anxiety with Nexplanon and that it is a good method of contraception with breastfeeding  Patient also had development of irregular bleeding at 3 years post insertion we discussed that Nexplanon is only indicated for 3 years at this time  Postpartum depression screening: negative  EPDS 5  Patient declined exam today because of recovering yeast infection does not feel that she will need additional treatment at this time  Gynecologic History  Patient's last menstrual period was 10/23/2021    Contraception: Considering Nexplanon counseled      Obstetric History  OB History    Para Term  AB Living   2 1 1   1 1   SAB IAB Ectopic Multiple Live Births     1     1      # Outcome Date GA Lbr Yosi/2nd Weight Sex Delivery Anes PTL Lv   2 Term 22 41w3d  4077 g (8 lb 15 8 oz) M CS-LTranv EPI N GIORGI   1 IAB                  The following portions of the patient's history were reviewed and updated as appropriate: allergies, current medications, past family history, past medical history, past social history, past surgical history and problem list     Review of Systems  Review of Systems   Constitutional: Negative for fatigue, fever and unexpected weight change  HENT: Negative for dental problem, sinus pressure and sinus pain  Eyes: Negative for visual disturbance  Respiratory: Negative for cough, shortness of breath and wheezing  Cardiovascular: Negative for chest pain and leg swelling  Gastrointestinal: Negative for blood in stool, constipation, diarrhea, nausea and vomiting  Endocrine: Negative for cold intolerance, heat intolerance and polydipsia  Genitourinary: Positive for vaginal bleeding  Negative for dysuria, frequency, hematuria, menstrual problem and pelvic pain  Musculoskeletal: Negative for arthralgias and back pain  Neurological: Negative for dizziness, seizures and headaches  Psychiatric/Behavioral: The patient is not nervous/anxious           Objective     /70   Wt (!) 148 kg (327 lb)   LMP 10/23/2021   Breastfeeding Yes Comment: bottle and breast  BMI 52 78 kg/m²   General appearance: alert and oriented, in no acute distress  Lungs: clear to auscultation bilaterally  Heart: regular rate and rhythm, S1, S2 normal, no murmur, click, rub or gallop  Abdomen: soft, non-tender; bowel sounds normal; no masses,  no organomegaly and Incision is clean generally intact and healing there is some signs of yeast infection in the middle there is also a small 1 cm open area at the left margin and a small area of skin mismatch in the mid section of about 1 cm and also smaller area about 5 mm to the right of the midline along the incision otherwise closing well  Extremities: extremities normal, warm and well-perfused; no cyanosis, clubbing, or edema      Assessment  45-year-old  about 3 weeks post  section steadily recovering some yeast infection at this time patient to call if needs further treatment reviewed Diflucan is safe during nursing if it is skin would use 100 daily for 7 days  Patient also reports some vaginal yeast symptoms that are resolving post antibiotic  Plan  Given sheet for Nexplanon codes patient to call insurance and then to call us to schedule Nexplanon should also return for annual in 3 months or sooner as needed  Counseled on using condoms or abstinence and to wait at least 18 months between deliveries for healing

## 2022-10-19 ENCOUNTER — PROCEDURE VISIT (OUTPATIENT)
Dept: OBGYN CLINIC | Facility: CLINIC | Age: 27
End: 2022-10-19
Payer: COMMERCIAL

## 2022-10-19 VITALS
BODY MASS INDEX: 47.09 KG/M2 | HEIGHT: 66 IN | SYSTOLIC BLOOD PRESSURE: 120 MMHG | WEIGHT: 293 LBS | DIASTOLIC BLOOD PRESSURE: 84 MMHG

## 2022-10-19 DIAGNOSIS — N89.8 VAGINAL DISCHARGE: ICD-10-CM

## 2022-10-19 DIAGNOSIS — T81.89XA INCISIONAL IRRITATION, INITIAL ENCOUNTER: ICD-10-CM

## 2022-10-19 DIAGNOSIS — Z30.017 NEXPLANON INSERTION: Primary | ICD-10-CM

## 2022-10-19 DIAGNOSIS — Z30.017 ENCOUNTER FOR INITIAL PRESCRIPTION OF IMPLANTABLE SUBDERMAL CONTRACEPTIVE: ICD-10-CM

## 2022-10-19 PROCEDURE — 11981 INSERTION DRUG DLVR IMPLANT: CPT | Performed by: NURSE PRACTITIONER

## 2022-10-19 PROCEDURE — 81025 URINE PREGNANCY TEST: CPT | Performed by: NURSE PRACTITIONER

## 2022-10-19 RX ORDER — DOCUSATE SODIUM 250 MG
250 CAPSULE ORAL DAILY
COMMUNITY

## 2022-10-19 RX ORDER — SACCHAROMYCES BOULARDII 250 MG
250 CAPSULE ORAL 2 TIMES DAILY
COMMUNITY

## 2022-10-19 NOTE — ASSESSMENT & PLAN NOTE
Vaginal discharge normal on pelvic exam today  Genital culture done per patient request    Will call with results and follow up accordingly  Reviewed loose cotton clothing  Loose cotton underwear, avoid thongs  Change promptly out of wet bathing suits or sweaty clothing  No scented products vaginally  Acidophilous probiotic daily

## 2022-10-19 NOTE — ASSESSMENT & PLAN NOTE
Reassured [address] [place] [address] no signs of infection today  Reviewed incision is well healed and no open areas noted  Discussed with [facility] trying to keep area clean and dry as best as possible  Noted is some breakdown of skin due to constant moisture in area secondary to anatomy and abdominal pannus folding over  Reviewed even placing a blow dryer on cool after a shower to ensure moisture is not trapped and area is completely dry  Reviewed increase risk of fungal infections and continued skin breakdown with continued moisture  Call office with any changes or concerns

## 2022-10-19 NOTE — PROGRESS NOTES
Assessment/Plan:    Incisional irritation  Reassured Ezio Reilly no signs of infection today  Reviewed incision is well healed and no open areas noted  Discussed with Ezio Reilly trying to keep area clean and dry as best as possible  Noted is some breakdown of skin due to constant moisture in area secondary to anatomy and abdominal pannus folding over  Reviewed even placing a blow dryer on cool after a shower to ensure moisture is not trapped and area is completely dry  Reviewed increase risk of fungal infections and continued skin breakdown with continued moisture  Call office with any changes or concerns  Vaginal discharge  Vaginal discharge normal on pelvic exam today  Genital culture done per patient request    Will call with results and follow up accordingly  Reviewed loose cotton clothing  Loose cotton underwear, avoid thongs  Change promptly out of wet bathing suits or sweaty clothing  No scented products vaginally  Acidophilous probiotic daily  Diagnoses and all orders for this visit:    Nexplanon insertion  -     Remove and insert drug implant  -     etonogestrel (NEXPLANON) subdermal implant 68 mg    Vaginal discharge  -     Genital Comprehensive Culture    Encounter for initial prescription of implantable subdermal contraceptive  -     Remove and insert drug implant  -     etonogestrel (NEXPLANON) subdermal implant 68 mg    Incisional irritation, initial encounter    Other orders  -     saccharomyces boulardii (FLORASTOR) 250 mg capsule; Take 250 mg by mouth 2 (two) times a day  -     docusate sodium (COLACE) 250 MG capsule; Take 250 mg by mouth daily          Subjective:      Patient ID: Brayan Elena is a 32 y o  female  Pt presents for nexplanon insertion for contraceptive     She is postpartum x 10 weeks and has some other concerns she would like to address today  Pt states a few days ago she noticed her incisional area was "red and gooey" she denies any drainage   Placing a surgical wash and some medicated powder on it  She last placed wash and powder yesterday   noted an odor at area but no open areas, just very wet  When she washed it with the gauze it was bleeding  Pt also complaining of night sweats and chills  Denies any fevers  Pt is breast and bottle feeding  Pt states yellowish/green discharge vaginally  Discharge first noticed 2 weeks ago  Discharge is the same over the last two weeks  Denies any itching, burning or odors vaginally  Denies any new products vaginally  The following portions of the patient's history were reviewed and updated as appropriate: allergies, current medications, past family history, past medical history, past social history, past surgical history and problem list     Review of Systems   Gastrointestinal:        States incisional area wet, noted some bleeding   Genitourinary: Positive for vaginal discharge  Objective:      /84 (BP Location: Left arm, Patient Position: Sitting)   Ht 5' 6" (1 676 m)   Wt (!) 147 kg (324 lb)   Breastfeeding Yes Comment: breast and bottle feeding  BMI 52 29 kg/m²          Physical Exam  Vitals and nursing note reviewed  Constitutional:       Appearance: She is well-developed  She is obese  HENT:      Head: Normocephalic and atraumatic  Neck:      Thyroid: No thyromegaly  Cardiovascular:      Rate and Rhythm: Normal rate and regular rhythm  Heart sounds: Normal heart sounds  Pulmonary:      Effort: Pulmonary effort is normal       Breath sounds: Normal breath sounds  Abdominal:      General: Bowel sounds are normal  There is no distension  Palpations: Abdomen is soft  There is no mass  Tenderness: There is no abdominal tenderness  There is no guarding or rebound  Comments: Incision well healed and approximtated  Skin at and around incision noted to have a lot of moisture and some breakdown of skin secondary to moisture   No active bleeding discharge erythema  No abnormal odors, discharge, or rashes  Q-tip with pressure applied along scar area to ensure incisional area completely intact  Genitourinary:     Labia:         Right: No rash, tenderness, lesion or injury  Left: No rash, tenderness, lesion or injury  Vagina: Normal  Vaginal discharge: thin homogonous discharge  Cervix: Normal       Comments: Uterus and adnexa difficult to assess due to body habitus  Musculoskeletal:         General: Normal range of motion  Cervical back: Normal range of motion and neck supple  Skin:     General: Skin is warm and dry  Neurological:      Mental Status: She is alert and oriented to person, place, and time  Psychiatric:         Behavior: Behavior normal          Thought Content: Thought content normal          Judgment: Judgment normal              Universal Protocol:  Consent: Verbal consent obtained  Written consent obtained  Risks and benefits: risks, benefits and alternatives were discussed  Consent given by: patient  Time out: Immediately prior to procedure a "time out" was called to verify the correct patient, procedure, equipment, support staff and site/side marked as required  Timeout called at: 10/19/2022 2:50 PM   Patient understanding: patient states understanding of the procedure being performed  Patient consent: the patient's understanding of the procedure matches consent given  Procedure consent: procedure consent matches procedure scheduled  Relevant documents: relevant documents present and verified  Test results: test results available and properly labeled  Site marked: the operative site was marked  Radiology Images displayed and confirmed   If images not available, report reviewed: imaging studies not available  Required items: required blood products, implants, devices, and special equipment available  Patient identity confirmed: verbally with patient    Remove and insert drug implant    Date/Time: 10/19/2022 2:50 PM  Performed by: Gina Soto  Authorized by: PHILIPPE Ferguson     Indication:     Indication: Insertion of non-biodegradable drug delivery implant    Pre-procedure:     Pre-procedure timeout performed: yes      Prepped with: povidone-iodine      Local anesthetic:  Lidocaine 1%    The site was cleaned and prepped in a sterile fashion: yes    Procedure:     Procedure: Insertion    Small stab incision was made in arm: yes      Left/right:  Left    Preloaded contraceptive capsule trocar was placed subdermally: yes      Visualization of implant was obtained: yes      Contraceptive capsule was inserted and trocar removed: yes      Visualization of notch in stylet and palpation of device: yes      Palpation confirms placement by provider and patient: yes      Site was closed with steri-strips and pressure bandage applied: yes    Comments:      No heavy lifting with left arm  May remove after 24 hrs  May remove steri-strips after 3-5 days  Call office with any s/sx of infection or bleeding at insertion site

## 2022-10-25 ENCOUNTER — TELEPHONE (OUTPATIENT)
Dept: OBGYN CLINIC | Facility: CLINIC | Age: 27
End: 2022-10-25

## 2022-10-25 DIAGNOSIS — B95.0 STREPTOCOCCAL INFECTION GROUP A: Primary | ICD-10-CM

## 2022-10-25 RX ORDER — AMOXICILLIN 500 MG/1
500 CAPSULE ORAL EVERY 12 HOURS SCHEDULED
Qty: 14 CAPSULE | Refills: 0 | Status: SHIPPED | OUTPATIENT
Start: 2022-10-25 | End: 2022-11-01

## 2022-10-25 NOTE — TELEPHONE ENCOUNTER
Pt called stating she received her most recent results for her culture - she would like to review  Please review and advise

## 2023-02-28 LAB — HBA1C MFR BLD HPLC: 5.4 %

## 2023-03-02 ENCOUNTER — APPOINTMENT (OUTPATIENT)
Dept: RADIOLOGY | Facility: CLINIC | Age: 28
End: 2023-03-02

## 2023-03-02 ENCOUNTER — OFFICE VISIT (OUTPATIENT)
Dept: FAMILY MEDICINE CLINIC | Facility: CLINIC | Age: 28
End: 2023-03-02

## 2023-03-02 VITALS
WEIGHT: 293 LBS | SYSTOLIC BLOOD PRESSURE: 120 MMHG | HEIGHT: 66 IN | HEART RATE: 106 BPM | BODY MASS INDEX: 47.09 KG/M2 | OXYGEN SATURATION: 98 % | RESPIRATION RATE: 16 BRPM | TEMPERATURE: 97.6 F | DIASTOLIC BLOOD PRESSURE: 70 MMHG

## 2023-03-02 DIAGNOSIS — Z01.818 PREOP EXAMINATION: Primary | ICD-10-CM

## 2023-03-02 DIAGNOSIS — Z01.818 PREOP EXAMINATION: ICD-10-CM

## 2023-03-02 PROBLEM — Z98.891 STATUS POST PRIMARY LOW TRANSVERSE CESAREAN SECTION: Status: RESOLVED | Noted: 2022-08-10 | Resolved: 2023-03-02

## 2023-03-02 PROBLEM — B00.9 HSV INFECTION: Status: ACTIVE | Noted: 2019-07-26

## 2023-03-02 PROBLEM — O26.03 EXCESSIVE WEIGHT GAIN DURING PREGNANCY IN THIRD TRIMESTER: Status: RESOLVED | Noted: 2022-06-08 | Resolved: 2023-03-02

## 2023-03-02 PROBLEM — O99.213 OBESITY DURING PREGNANCY IN THIRD TRIMESTER: Status: RESOLVED | Noted: 2022-06-08 | Resolved: 2023-03-02

## 2023-03-02 PROBLEM — Z87.59 HISTORY OF GESTATIONAL HYPERTENSION: Status: ACTIVE | Noted: 2022-08-10

## 2023-03-02 PROBLEM — N89.8 VAGINAL DISCHARGE: Status: RESOLVED | Noted: 2022-10-19 | Resolved: 2023-03-02

## 2023-03-02 PROBLEM — Z34.90 ENCOUNTER FOR INDUCTION OF LABOR: Status: RESOLVED | Noted: 2022-08-08 | Resolved: 2023-03-02

## 2023-03-02 PROBLEM — E66.01 MORBID OBESITY (HCC): Status: RESOLVED | Noted: 2020-02-25 | Resolved: 2023-03-02

## 2023-03-02 PROBLEM — T81.89XA INCISIONAL IRRITATION: Status: RESOLVED | Noted: 2022-10-19 | Resolved: 2023-03-02

## 2023-03-02 PROBLEM — T81.49XA INCISIONAL INFECTION: Status: RESOLVED | Noted: 2022-08-19 | Resolved: 2023-03-02

## 2023-03-02 RX ORDER — DEXTROAMPHETAMINE SACCHARATE, AMPHETAMINE ASPARTATE, DEXTROAMPHETAMINE SULFATE AND AMPHETAMINE SULFATE 5; 5; 5; 5 MG/1; MG/1; MG/1; MG/1
1 TABLET ORAL EVERY MORNING
COMMUNITY
Start: 2023-02-07

## 2023-03-02 NOTE — PROGRESS NOTES
FAMILY PRACTICE PRE-OPERATIVE EVALUATION  [facility]    NAME: Sheyla Walters  AGE: 32 y o  SEX: female  : 1995     DATE: 3/6/2023    Family Practice Pre-Operative Evaluation      Chief Complaint: Pre-operative Evaluation     Surgery: gastric sleeve  Anticipated Date of Surgery: tentative end of 2023  Surgeon: Dr Lavell Macario      History of Present Illness:     Here today for prep examination  Will be going for gastric sleeve, surgeon is Dr Lavell Macario in [facility]  Surgery is tentatively scheduled for the end of the month, however will need GI consult and EGD done prior  Labs done at [facility] yesterday, also need CXR   Hx gestational hypertension, she has tried various diets without significant success including Nutrisystem, weight watchers, paleo, keto  No physical health concerns at present  Anesthesia:  general  Bleeding Risk: no recent abnormal bleeding, no remote history of abnormal bleeding and no use of Ca-channel blockers  Current Anti-platelet/anticoagulation medication: none    Assessment of Cardiac Risk:  Denies unstable or severe angina or MI in the last 6 weeks or history of stent placement in the last year   Denies decompensated heart failure (e g  New onset heart failure, NYHA functional class IV heart failure, or worsening existing heart failure)  Denies significant arrhythmias such as high grade AV block, symptomatic ventricular arrhythmia, newly recognized ventricular tachycardia, supraventricular tachycardia with resting heart rate >100, or symptomatic bradycardia  Denies severe heart valve disease including aortic stenosis or symptomatic mitral stenosis     Exercise Capacity:  Able to walk 4 blocks without symptoms?: Yes  Able to walk 2 flights without symptoms?: Yes    Prior Anesthesia Reactions: No     Personal history of venous thromboembolic disease? No    History of steroid use for >2 weeks within last year?  No Review of Systems:     Review of Systems   Constitutional: Negative for chills, fatigue and fever  Respiratory: Negative for cough, shortness of breath and wheezing  Cardiovascular: Negative for chest pain, palpitations and leg swelling  Gastrointestinal: Negative for abdominal pain, diarrhea, nausea and vomiting  Genitourinary: Negative  Skin: Negative for rash  Neurological: Negative for dizziness and headaches  Current Problem List:     Patient Active Problem List   Diagnosis   • HSV infection   • Anxiety and depression   • BMI 50 0-59 9, adult ([facility] )   • ADHD   • History of gestational hypertension       Allergies: Allergies   Allergen Reactions   • Lorabid [Loracarbef] Other (See Comments)     Unknown since childhood       Current Medications:       Current Outpatient Medications:   •  amphetamine-dextroamphetamine (ADDERALL) 20 mg tablet, Take 1 tablet by mouth every morning, Disp: , Rfl:   •  Cholecalciferol 25 MCG (1000 UT) capsule, Take 1,000 Units by mouth daily, Disp: , Rfl:   •  Magnesium-Potassium (MAGNESIUM FIZZ-PLUS PO), Take by mouth, Disp: , Rfl:   •  sertraline (ZOLOFT) 50 mg tablet, Take 50 mg by mouth every morning, Disp: , Rfl:   •  valACYclovir (VALTREX) 500 mg tablet, TAKE 2 TABLETS DAILY (Patient not taking: Reported on 2022), Disp: 60 tablet, Rfl: 1    Past Medical History:       Past Medical History:   Diagnosis Date   • ADHD (attention deficit hyperactivity disorder)    • Anxiety    • Asthma    • COPD (chronic obstructive pulmonary disease) ([facility] )    • Depression    • Pneumonia    • Status post primary low transverse  section 8/10/2022   • Urinary tract infection         Past Surgical History:   Procedure Laterality Date   •  SECTION N/A 2022    Procedure:  SECTION ();   Surgeon: Sergio Anderson MD;  Location: [facility] ;  Service: Obstetrics   • WISDOM TOOTH EXTRACTION          Family History   Problem Relation Age of Onset • Hypertension Mother    • COPD Father    • Heart disease Father    • Hypertension Father    • Substance Abuse Neg Hx    • Mental illness Neg Hx         Social History     Socioeconomic History   • Marital status: /Civil Union     Spouse name: Not on file   • Number of children: Not on file   • Years of education: Not on file   • Highest education level: Not on file   Occupational History   • Not on file   Tobacco Use   • Smoking status: Former     Types: E-Cigarettes   • Smokeless tobacco: Never   • Tobacco comments:     juul - equivalent to 1 ppd   Vaping Use   • Vaping Use: Every day   Substance and Sexual Activity   • Alcohol use: Not Currently   • Drug use: Not Currently     Types: Marijuana   • Sexual activity: Yes     Birth control/protection: Condom Male   Other Topics Concern   • Not on file   Social History Narrative   • Not on file     Social Determinants of Health     Financial Resource Strain: Not on file   Food Insecurity: Not on file   Transportation Needs: Not on file   Physical Activity: Not on file   Stress: Not on file   Social Connections: Not on file   Intimate Partner Violence: Not on file   Housing Stability: Not on file        Physical Exam:     /70 (BP Location: Left arm, Patient Position: Sitting, Cuff Size: Large)   Pulse (!) 106   Temp 97 6 °F (36 4 °C) (Temporal)   Resp 16   Ht 5' 6" (1 676 m)   Wt (!) 143 kg (315 lb)   SpO2 98%   BMI 50 84 kg/m²     Physical Exam  Vitals and nursing note reviewed  Constitutional:       Appearance: Normal appearance  She is well-developed  She is obese  HENT:      Head: Normocephalic and atraumatic  Right Ear: Tympanic membrane, ear canal and external ear normal       Left Ear: Tympanic membrane, ear canal and external ear normal       Nose: No mucosal edema or rhinorrhea  Mouth/Throat:      Pharynx: Uvula midline  Eyes:      Conjunctiva/sclera: Conjunctivae normal    Neck:      Thyroid: No thyromegaly  Vascular: No carotid bruit  Cardiovascular:      Rate and Rhythm: Normal rate and regular rhythm  Pulses: Normal pulses  Heart sounds: Normal heart sounds  No murmur heard  Pulmonary:      Effort: Pulmonary effort is normal       Breath sounds: Normal breath sounds  Abdominal:      General: Bowel sounds are normal  There is no distension  Palpations: There is no hepatomegaly or splenomegaly  Tenderness: There is no abdominal tenderness  Musculoskeletal:      Cervical back: Neck supple  No edema  Lymphadenopathy:      Cervical:      Right cervical: No superficial cervical adenopathy  Left cervical: No superficial cervical adenopathy  Skin:     General: Skin is warm and dry  Findings: No rash  Neurological:      Mental Status: She is alert  Psychiatric:         Mood and Affect: Mood normal          Behavior: Behavior normal           Data:     Pre-operative work-up    Laboratory Results: I have personally reviewed the pertinent laboratory results/reports      EKG: I have personally reviewed pertinent reports  No results found for this or any previous visit (from the past 672 hour(s))  Assessment & Recommendations:     Problem List Items Addressed This Visit        Other    BMI 50 0-59 9, adult ([facility] )   Other Visit Diagnoses     Preop examination    -  Primary    Relevant Orders    POCT ECG (Completed)    XR chest pa & lateral (Completed)          Pre-Op Evaluation Assessment  32 y o  female with planned surgery: as above  Known risk factors for perioperative complications: None  Current medications which may produce withdrawal symptoms if withheld perioperatively: none  Pre-Op Evaluation Plan  1  Further preoperative workup as follows:   - None; no further preoperative work-up is required    2   Medication Management/Recommendations:   - None, continue medication regimen including morning of surgery, with sip of water  - Patient has been instructed to avoid herbs or non-directed vitamins the week prior to surgery to ensure no drug interactions with perioperative surgical and anesthetic medications  - Patient has been instructed to avoid aspirin containing medications or non-steroidal anti-inflammatory drugs for the week preceding surgery  3  Prophylaxis for cardiac events with perioperative beta-blockers: not indicated  4  Patient requires further consultation with: None    Clearance  Patient is CLEARED for surgery without any additional cardiac testing       Jonah [address]  JEANIE [address] [place] [id number]  Phone#  [phone]  Fax#  [phone]

## 2023-03-06 ENCOUNTER — HOSPITAL ENCOUNTER (OUTPATIENT)
Dept: RADIOLOGY | Facility: HOSPITAL | Age: 28
Discharge: HOME | End: 2023-03-06

## 2023-03-06 DIAGNOSIS — E66.01 SEVERE OBESITY (HCC): ICD-10-CM

## 2023-03-06 DIAGNOSIS — J45.909 ASTHMATIC BRONCHITIS WITHOUT COMPLICATION, UNSPECIFIED ASTHMA SEVERITY, UNSPECIFIED WHETHER PERSISTENT: ICD-10-CM

## 2023-03-06 DIAGNOSIS — M54.50 LOW BACK PAIN, UNSPECIFIED BACK PAIN LATERALITY, UNSPECIFIED CHRONICITY, UNSPECIFIED WHETHER SCIATICA PRESENT: ICD-10-CM

## 2023-03-06 DIAGNOSIS — F41.9 ANXIETY: ICD-10-CM

## 2023-03-06 DIAGNOSIS — Z11.59: ICD-10-CM

## 2023-03-06 DIAGNOSIS — G43.109 COMPLICATED MIGRAINE: ICD-10-CM

## 2023-03-06 DIAGNOSIS — R05.9 COUGH, UNSPECIFIED TYPE: ICD-10-CM

## 2023-03-07 ENCOUNTER — OFFICE VISIT (OUTPATIENT)
Dept: GASTROENTEROLOGY | Facility: CLINIC | Age: 28
End: 2023-03-07

## 2023-03-07 VITALS
HEIGHT: 66 IN | HEART RATE: 81 BPM | BODY MASS INDEX: 47.09 KG/M2 | WEIGHT: 293 LBS | DIASTOLIC BLOOD PRESSURE: 89 MMHG | SYSTOLIC BLOOD PRESSURE: 134 MMHG | OXYGEN SATURATION: 97 %

## 2023-03-07 DIAGNOSIS — K64.9 HEMORRHOIDS, UNSPECIFIED HEMORRHOID TYPE: ICD-10-CM

## 2023-03-07 NOTE — PATIENT INSTRUCTIONS
For your bowel habits, as we discussed during today's visit,  - I would recommend starting psyllium, fiber supplementation  This can be done with use of Konsyl, Citrucil, Metamucil or Benefiber fiber, which can be purchased over-the-counter  I would recommend starting slow with 1 tsp mixed into a large glass of water, once a day, and increasing to goal of 1 tbsp mixed into a large glass of water per day    - this helps with both diarrhea and constipation, helping to bulk the stool, and should not cause constipation or diarrhea, but treat both  - the only side effect of this can be bloating, if this occurs, cut down on the dose, and increase your water intake or alternatively, consider switching to an alternative as listed above     Scheduled date of EGD(as of today): 03/13/23  Physician performing EGD: Kenney Cleary  Location of EGD: [facility]  Instructions reviewed with patient by: VIRGINIA  Clearances: NONE

## 2023-03-07 NOTE — PROGRESS NOTES
[facility] - Outpatient Consultation  Toby Mckeon 32 y o  female MRN: [id number]  Encounter: [id number]      PCP: Sumit Chu  Referring: No referring provider defined for this encounter  ASSESSMENT AND PLAN:      1  BMI 50 0-59 9, adult [facility])  Scheduled for sleeve gastrectomy March 27  - EGD; Future    2  Hemorrhoids, unspecified hemorrhoid type  Recommend psyllium fiber    ______________________________________________________________________    CC:  Chief Complaint   Patient presents with   • EGD       HPI:      Patient is a 26-year-old female referred for preop endoscopy  She has BMI 50, ADHD, anxiety/depression, asthma  She is scheduled to go undergo sleeve gastrectomy on March 22 with Dr Jose C Dye who has recommended preoperative endoscopy for evaluation of GERD  She denies any GERD symptoms, denies dysphagia, abdominal pain  She has had occasional, once every few months small amounts of bright red blood per rectum on her toilet paper  She has initial hard stools, which are very soft in nature  She denies any straining, and has bowel movements 1-2 times per day  She denies any family history of GI cancers  REVIEW OF SYSTEMS:    CONSTITUTIONAL: Denies any fever, chills, rigors, and weight loss  HEENT: No earache or tinnitus  Denies hearing loss or visual disturbances  CARDIOVASCULAR: No chest pain or palpitations  RESPIRATORY: Denies any cough, hemoptysis, shortness of breath or dyspnea on exertion  GASTROINTESTINAL: As noted in the History of Present Illness  GENITOURINARY: No problems with urination  Denies any hematuria or dysuria  NEUROLOGIC: No dizziness or vertigo, denies headaches  MUSCULOSKELETAL: Denies any muscle or joint pain  SKIN: Denies skin rashes or itching  ENDOCRINE: Denies excessive thirst  Denies intolerance to heat or cold  PSYCHOSOCIAL: Denies depression or anxiety  Denies any recent memory loss  Historical Information   Past Medical History:   Diagnosis Date   • ADHD (attention deficit hyperactivity disorder)    • Anxiety    • Asthma    • COPD (chronic obstructive pulmonary disease) ([facility] )    • Depression    • Pneumonia    • Status post primary low transverse  section 8/10/2022   • Urinary tract infection      Past Surgical History:   Procedure Laterality Date   •  SECTION N/A 2022    Procedure:  SECTION (); Surgeon: Inocencia Ferrer MD;  Location: [facility] ;  Service: Obstetrics   • WISDOM TOOTH EXTRACTION       Social History   Social History     Substance and Sexual Activity   Alcohol Use Not Currently     Social History     Substance and Sexual Activity   Drug Use Not Currently   • Types: Marijuana     Social History     Tobacco Use   Smoking Status Former   • Types: E-Cigarettes   Smokeless Tobacco Never   Tobacco Comments    juul - equivalent to 1 ppd     Family History   Problem Relation Age of Onset   • Hypertension Mother    • COPD Father    • Heart disease Father    • Hypertension Father    • Substance Abuse Neg Hx    • Mental illness Neg Hx        Meds/Allergies       Current Outpatient Medications:   •  amphetamine-dextroamphetamine (ADDERALL) 20 mg tablet  •  Cholecalciferol 25 MCG (1000 UT) capsule  •  Magnesium-Potassium (MAGNESIUM FIZZ-PLUS PO)  •  sertraline (ZOLOFT) 50 mg tablet    Allergies   Allergen Reactions   • Lorabid [Loracarbef] Other (See Comments)     Unknown since childhood           Objective     Blood pressure 134/89, pulse 81, height 5' 6" (1 676 m), weight (!) 142 kg (314 lb), SpO2 97 %, currently breastfeeding  Body mass index is 50 68 kg/m²  PHYSICAL EXAM:      General Appearance:   Alert, cooperative, no distress   HEENT:   Normocephalic, atraumatic, anicteric       Neck:  Supple, symmetrical, trachea midline   Lungs:   Clear to auscultation bilaterally; no rales, rhonchi or wheezing; respirations unlabored    Heart[de-identified] Regular rate and rhythm; no murmur, rub, or gallop  Abdomen:   Soft, non-tender, non-distended; normal bowel sounds; no masses, no organomegaly    Genitalia:   Deferred    Rectal:   Deferred    Extremities:  No cyanosis, clubbing or edema    Pulses:  2+ and symmetric    Skin:  No jaundice, rashes, or lesions    Lymph nodes:  No palpable cervical lymphadenopathy        Lab Results:     Lab Results   Component Value Date    WBC 18 35 (H) 08/10/2022    HGB 10 7 (L) 08/10/2022    HCT 31 6 (L) 08/10/2022    MCV 95 08/10/2022     08/10/2022       Lab Results   Component Value Date    K 3 9 08/10/2022     08/10/2022    CO2 22 08/10/2022    BUN 9 08/10/2022    CREATININE 0 44 (L) 08/10/2022    CALCIUM 8 0 (L) 08/10/2022    CORRECTEDCA 9 1 08/10/2022    AST 25 08/10/2022    ALT 17 08/10/2022    ALKPHOS 107 (H) 08/10/2022    EGFR 139 08/10/2022       No results found for: INR, PROTIME      Radiology Results:   XR chest pa & lateral    Result Date: 3/4/2023  Narrative: CHEST INDICATION:   [id number]: Encounter for other preprocedural examination  COMPARISON:  CXR 7/21/2019  EXAM PERFORMED/VIEWS:  XR CHEST PA & LATERAL FINDINGS: Cardiomediastinal silhouette appears unremarkable  The lungs are clear  No pneumothorax or pleural effusion  Osseous structures appear within normal limits for patient age  Impression: No acute cardiopulmonary disease  Workstation performed: [id number]     VAS lower limb venous duplex study, complete bilateral    Result Date: 3/6/2023  Narrative:  [facility] REPORT [facility]INICAL: Indications: Pre-op surgical evaluation to determine patency of the venous system  Operative History: Patient denies prior cardiovascular  CONCLUSION:  Impression: RIGHT LOWER LIMB: No evidence of acute or chronic deep vein thrombosis  No evidence of superficial thrombophlebitis noted  No evidence of valvular incompetence noted in the deep veins   Popliteal, posterior tibial and anterior tibial arterial Doppler waveforms are triphasic  LEFT LOWER LIMB: No evidence of acute or chronic deep vein thrombosis  No evidence of superficial thrombophlebitis noted  No evidence of valvular incompetence noted in the deep veins  Popliteal, posterior tibial and anterior tibial arterial Doppler waveforms are triphasic  SIGNATURE: Electronically Signed by: Alesia Charles on 2023-03-06 11:15:53 AM      Portions of the record may have been created with voice recognition software  Occasional wrong word or "sound a like" substitutions may have occurred due to the inherent limitations of voice recognition software  Read the chart carefully and recognize, using context, where substitutions have occurred

## 2023-03-07 NOTE — H&P (VIEW-ONLY)
[facility] - Outpatient Consultation  Nona Hua 32 y o  female MRN: [id number]  Encounter: [id number]      PCP: Alejandro Mathew  Referring: No referring provider defined for this encounter  ASSESSMENT AND PLAN:      1  BMI 50 0-59 9, adult [facility])  Scheduled for sleeve gastrectomy March 27  - EGD; Future    2  Hemorrhoids, unspecified hemorrhoid type  Recommend psyllium fiber    ______________________________________________________________________    CC:  Chief Complaint   Patient presents with   • EGD       HPI:      Patient is a 78-year-old female referred for preop endoscopy  She has BMI 50, ADHD, anxiety/depression, asthma  She is scheduled to go undergo sleeve gastrectomy on March 22 with Dr Patric Orellana who has recommended preoperative endoscopy for evaluation of GERD  She denies any GERD symptoms, denies dysphagia, abdominal pain  She has had occasional, once every few months small amounts of bright red blood per rectum on her toilet paper  She has initial hard stools, which are very soft in nature  She denies any straining, and has bowel movements 1-2 times per day  She denies any family history of GI cancers  REVIEW OF SYSTEMS:    CONSTITUTIONAL: Denies any fever, chills, rigors, and weight loss  HEENT: No earache or tinnitus  Denies hearing loss or visual disturbances  CARDIOVASCULAR: No chest pain or palpitations  RESPIRATORY: Denies any cough, hemoptysis, shortness of breath or dyspnea on exertion  GASTROINTESTINAL: As noted in the History of Present Illness  GENITOURINARY: No problems with urination  Denies any hematuria or dysuria  NEUROLOGIC: No dizziness or vertigo, denies headaches  MUSCULOSKELETAL: Denies any muscle or joint pain  SKIN: Denies skin rashes or itching  ENDOCRINE: Denies excessive thirst  Denies intolerance to heat or cold  PSYCHOSOCIAL: Denies depression or anxiety  Denies any recent memory loss  Historical Information   Past Medical History:   Diagnosis Date   • ADHD (attention deficit hyperactivity disorder)    • Anxiety    • Asthma    • COPD (chronic obstructive pulmonary disease) ([facility] [address] )    • Depression    • Pneumonia    • Status post primary low transverse  section 8/10/2022   • Urinary tract infection      Past Surgical History:   Procedure Laterality Date   •  SECTION N/A 2022    Procedure:  SECTION (); Surgeon: Reuben Brunner MD;  Location: [facility] ;  Service: Obstetrics   • WISDOM TOOTH EXTRACTION       Social History   Social History     Substance and Sexual Activity   Alcohol Use Not Currently     Social History     Substance and Sexual Activity   Drug Use Not Currently   • Types: Marijuana     Social History     Tobacco Use   Smoking Status Former   • Types: E-Cigarettes   Smokeless Tobacco Never   Tobacco Comments    juul - equivalent to 1 ppd     Family History   Problem Relation Age of Onset   • Hypertension Mother    • COPD Father    • Heart disease Father    • Hypertension Father    • Substance Abuse Neg Hx    • Mental illness Neg Hx        Meds/Allergies       Current Outpatient Medications:   •  amphetamine-dextroamphetamine (ADDERALL) 20 mg tablet  •  Cholecalciferol 25 MCG (1000 UT) capsule  •  Magnesium-Potassium (MAGNESIUM FIZZ-PLUS PO)  •  sertraline (ZOLOFT) 50 mg tablet    Allergies   Allergen Reactions   • Lorabid [Loracarbef] Other (See Comments)     Unknown since childhood           Objective     Blood pressure 134/89, pulse 81, height 5' 6" (1 676 m), weight (!) 142 kg (314 lb), SpO2 97 %, currently breastfeeding  Body mass index is 50 68 kg/m²  PHYSICAL EXAM:      General Appearance:   Alert, cooperative, no distress   HEENT:   Normocephalic, atraumatic, anicteric       Neck:  Supple, symmetrical, trachea midline   Lungs:   Clear to auscultation bilaterally; no rales, rhonchi or wheezing; respirations unlabored    Heart[de-identified] Regular rate and rhythm; no murmur, rub, or gallop  Abdomen:   Soft, non-tender, non-distended; normal bowel sounds; no masses, no organomegaly    Genitalia:   Deferred    Rectal:   Deferred    Extremities:  No cyanosis, clubbing or edema    Pulses:  2+ and symmetric    Skin:  No jaundice, rashes, or lesions    Lymph nodes:  No palpable cervical lymphadenopathy        Lab Results:     Lab Results   Component Value Date    WBC 18 35 (H) 08/10/2022    HGB 10 7 (L) 08/10/2022    HCT 31 6 (L) 08/10/2022    MCV 95 08/10/2022     08/10/2022       Lab Results   Component Value Date    K 3 9 08/10/2022     08/10/2022    CO2 22 08/10/2022    BUN 9 08/10/2022    CREATININE 0 44 (L) 08/10/2022    CALCIUM 8 0 (L) 08/10/2022    CORRECTEDCA 9 1 08/10/2022    AST 25 08/10/2022    ALT 17 08/10/2022    ALKPHOS 107 (H) 08/10/2022    EGFR 139 08/10/2022       No results found for: INR, PROTIME      Radiology Results:   XR chest pa & lateral    Result Date: 3/4/2023  Narrative: CHEST INDICATION:   [id number]: Encounter for other preprocedural examination  COMPARISON:  CXR 7/21/2019  EXAM PERFORMED/VIEWS:  XR CHEST PA & LATERAL FINDINGS: Cardiomediastinal silhouette appears unremarkable  The lungs are clear  No pneumothorax or pleural effusion  Osseous structures appear within normal limits for patient age  Impression: No acute cardiopulmonary disease  Workstation performed: [id number]     VAS lower limb venous duplex study, complete bilateral    Result Date: 3/6/2023  Narrative:  [facility] REPORT [facility]INICAL: Indications: Pre-op surgical evaluation to determine patency of the venous system  Operative History: Patient denies prior cardiovascular  CONCLUSION:  Impression: RIGHT LOWER LIMB: No evidence of acute or chronic deep vein thrombosis  No evidence of superficial thrombophlebitis noted  No evidence of valvular incompetence noted in the deep veins   Popliteal, posterior tibial and anterior tibial arterial Doppler waveforms are triphasic  LEFT LOWER LIMB: No evidence of acute or chronic deep vein thrombosis  No evidence of superficial thrombophlebitis noted  No evidence of valvular incompetence noted in the deep veins  Popliteal, posterior tibial and anterior tibial arterial Doppler waveforms are triphasic  SIGNATURE: Electronically Signed by: Paxton Flores on 2023-03-06 11:15:53 AM      Portions of the record may have been created with voice recognition software  Occasional wrong word or "sound a like" substitutions may have occurred due to the inherent limitations of voice recognition software  Read the chart carefully and recognize, using context, where substitutions have occurred

## 2023-03-08 ENCOUNTER — TELEPHONE (OUTPATIENT)
Dept: GASTROENTEROLOGY | Facility: AMBULARY SURGERY CENTER | Age: 28
End: 2023-03-08

## 2023-03-13 ENCOUNTER — HOSPITAL ENCOUNTER (OUTPATIENT)
Dept: GASTROENTEROLOGY | Facility: HOSPITAL | Age: 28
Setting detail: OUTPATIENT SURGERY
Discharge: HOME | End: 2023-03-13
Attending: INTERNAL MEDICINE

## 2023-03-13 ENCOUNTER — ANESTHESIA (OUTPATIENT)
Dept: ANESTHESIOLOGY | Facility: HOSPITAL | Age: 28
End: 2023-03-13

## 2023-03-13 ENCOUNTER — ANESTHESIA EVENT (OUTPATIENT)
Dept: GASTROENTEROLOGY | Facility: HOSPITAL | Age: 28
End: 2023-03-13

## 2023-03-13 ENCOUNTER — ANESTHESIA EVENT (OUTPATIENT)
Dept: ANESTHESIOLOGY | Facility: HOSPITAL | Age: 28
End: 2023-03-13

## 2023-03-13 ENCOUNTER — ANESTHESIA (OUTPATIENT)
Dept: GASTROENTEROLOGY | Facility: HOSPITAL | Age: 28
End: 2023-03-13

## 2023-03-13 VITALS
DIASTOLIC BLOOD PRESSURE: 71 MMHG | WEIGHT: 293 LBS | SYSTOLIC BLOOD PRESSURE: 125 MMHG | OXYGEN SATURATION: 96 % | HEART RATE: 89 BPM | TEMPERATURE: 97.8 F | RESPIRATION RATE: 18 BRPM | BODY MASS INDEX: 47.09 KG/M2 | HEIGHT: 66 IN

## 2023-03-13 LAB
EXT PREGNANCY TEST URINE: NEGATIVE
EXT. CONTROL: NORMAL

## 2023-03-13 RX ORDER — PROPOFOL 10 MG/ML
INJECTION, EMULSION INTRAVENOUS AS NEEDED
Status: DISCONTINUED | OUTPATIENT
Start: 2023-03-13 | End: 2023-03-13

## 2023-03-13 RX ORDER — LIDOCAINE HYDROCHLORIDE 20 MG/ML
INJECTION, SOLUTION EPIDURAL; INFILTRATION; INTRACAUDAL; PERINEURAL AS NEEDED
Status: DISCONTINUED | OUTPATIENT
Start: 2023-03-13 | End: 2023-03-13

## 2023-03-13 RX ORDER — SODIUM CHLORIDE, SODIUM LACTATE, POTASSIUM CHLORIDE, CALCIUM CHLORIDE 600; 310; 30; 20 MG/100ML; MG/100ML; MG/100ML; MG/100ML
INJECTION, SOLUTION INTRAVENOUS CONTINUOUS PRN
Status: DISCONTINUED | OUTPATIENT
Start: 2023-03-13 | End: 2023-03-13

## 2023-03-13 RX ADMIN — SODIUM CHLORIDE, SODIUM LACTATE, POTASSIUM CHLORIDE, AND CALCIUM CHLORIDE: .6; .31; .03; .02 INJECTION, SOLUTION INTRAVENOUS at 14:02

## 2023-03-13 RX ADMIN — PROPOFOL 30 MG: 10 INJECTION, EMULSION INTRAVENOUS at 14:12

## 2023-03-13 RX ADMIN — PROPOFOL 200 MG: 10 INJECTION, EMULSION INTRAVENOUS at 14:06

## 2023-03-13 RX ADMIN — LIDOCAINE HYDROCHLORIDE 100 MG: 20 INJECTION, SOLUTION EPIDURAL; INFILTRATION; INTRACAUDAL at 14:06

## 2023-03-13 RX ADMIN — PROPOFOL 20 MG: 10 INJECTION, EMULSION INTRAVENOUS at 14:10

## 2023-03-13 RX ADMIN — PROPOFOL 100 MG: 10 INJECTION, EMULSION INTRAVENOUS at 14:08

## 2023-03-13 NOTE — INTERVAL H&P NOTE
H&P reviewed  After examining the patient I find no changes in the patients condition since the H&P had been written      Vitals:    03/13/23 1315   BP: 115/64   Pulse: 85   Resp: 16   Temp: 97 5 °F (36 4 °C)   SpO2: 96%

## 2023-03-13 NOTE — ANESTHESIA POSTPROCEDURE EVALUATION
Post-Op Assessment Note    CV Status:  Stable  Pain Score: 0    Pain management: adequate     Mental Status:  Awake   Hydration Status:  Euvolemic   PONV Controlled:  Controlled   Airway Patency:  Patent      Post Op Vitals Reviewed: Yes      Staff: CRNA         No notable events documented      /87 (03/13/23 1420)    Temp 97 8 °F (36 6 °C) (03/13/23 1420)    Pulse 95 (03/13/23 1420)   Resp 18 (03/13/23 1420)    SpO2 100 % (03/13/23 1420)

## 2023-03-13 NOTE — ANESTHESIA PREPROCEDURE EVALUATION
Procedure:  EGD  For sleeve gastrectomy  Relevant Problems   NEURO/PSYCH   (+) Anxiety and depression   (+) History of gestational hypertension      Other   (+) ADHD   (+) [facility] 50 0-59 9[facility]      Recent labs personally reviewed:  Lab Results   Component Value Date    WBC 18 35 (H) 08/10/2022    HGB 10 7 (L) 08/10/2022     08/10/2022     Lab Results   Component Value Date    K 3 9 08/10/2022    BUN 9 08/10/2022    CREATININE 0 44 (L) 08/10/2022     No results found for: PTT   No results found for: INR    Lab Results   Component Value Date    HGBA1C 5 4 02/28/2023       Type and Screen:  A           Anesthesia Plan  ASA Score- 3     Anesthesia Type- IV sedation with anesthesia with ASA Monitors  Additional Monitors:   Airway Plan:           Plan Factors-Exercise tolerance (METS): <4 METS  Chart reviewed  Patient summary reviewed  Induction- intravenous      Postoperative Plan-     Informed Consent-

## 2023-03-13 NOTE — ANESTHESIA PREPROCEDURE EVALUATION
Procedure:  PRE-OP ONLY  EGD, scheduled for sleeve gastrectomy    Relevant Problems   NEURO/PSYCH   (+) Anxiety and depression   (+) History of gestational hypertension      Other   (+) BMI 50 0-59 9, [facility]      Recent labs personally reviewed:  Lab Results   Component Value Date    WBC 18 35 (H) 08/10/2022    HGB 10 7 (L) 08/10/2022     08/10/2022     Lab Results   Component Value Date    K 3 9 08/10/2022    BUN 9 08/10/2022    CREATININE 0 44 (L) 08/10/2022     No results found for: PTT   No results found for: INR    Lab Results   Component Value Date    HGBA1C 5 4 02/28/2023       Type and Screen:  A

## (undated) DEVICE — SUT VICRYL 0 CTX 36 IN J978H

## (undated) DEVICE — SURGIFOAM 8.5 X 12.5

## (undated) DEVICE — PILLOW FETAL SILICONE BALLOON DVC

## (undated) DEVICE — DRESSING MEPILEX AG BORDER 4 X 8 IN

## (undated) DEVICE — SURGIFOAM 7 X 12 SPONGE ABS

## (undated) DEVICE — SUT VICRYL 0 CT-1 36 IN J946H

## (undated) DEVICE — SKIN MARKER DUAL TIP WITH RULER CAP, FLEXIBLE RULER AND LABELS: Brand: DEVON

## (undated) DEVICE — Device

## (undated) DEVICE — PACK C-SECTION PBDS

## (undated) DEVICE — GLOVE INDICATOR PI UNDERGLOVE SZ 7.5 BLUE

## (undated) DEVICE — GLOVE PI ULTRA TOUCH SZ.7.5